# Patient Record
Sex: MALE | Race: ASIAN | NOT HISPANIC OR LATINO | Employment: UNEMPLOYED | URBAN - METROPOLITAN AREA
[De-identification: names, ages, dates, MRNs, and addresses within clinical notes are randomized per-mention and may not be internally consistent; named-entity substitution may affect disease eponyms.]

---

## 2022-01-03 ENCOUNTER — E-VISIT (OUTPATIENT)
Dept: URGENT CARE | Facility: URGENT CARE | Age: 62
End: 2022-01-03

## 2022-01-03 DIAGNOSIS — Z20.822 SUSPECTED COVID-19 VIRUS INFECTION: Primary | ICD-10-CM

## 2022-01-03 PROCEDURE — 99421 OL DIG E/M SVC 5-10 MIN: CPT | Performed by: PREVENTIVE MEDICINE

## 2022-01-03 NOTE — PATIENT INSTRUCTIONS
Brandan,      Based on your responses, you may have coronavirus (COVID-19). This illness can cause fever, cough and trouble breathing. Many people get a mild case and get better on their own. Some people can get very sick.    Will I be tested for COVID-19?  We would like to test you for COVID-19 virus. I have placed orders for this test.     To schedule: go to your [employer] home page and scroll down to the section that says  You have an appointment that needs to be scheduled  and click the large green button that says  Schedule Now  and follow the steps to find the next available openings.    If you are unable to complete these [employer] scheduling steps, please call [phone] to schedule your testing.     Return to work/school/ guidance:  Please let your workplace manager and staffing office know when your isolation ends.       If you receive a positive COVID-19 test result, follow the guidance of the those who are giving you the results. Usually the return to work is 10 days from symptom onset or positive test date, (or in some cases 20 days if you are immunocompromised). If your symptoms started after your positive test, the 10 days should start when your symptoms started.   o If you work at [employer], you must also be cleared by Employee Occupational Health and Safety to return to work.      If you receive a negative COVID-19 test result and did not have a high risk exposure to someone with a known positive COVID-19 test, you can return to work once you're free of fever for 24 hours without fever-reducing medication and your symptoms are improving or resolved.    If you receive a negative COVID-19 test and had a high-risk exposure to someone who has tested positive for COVID-19 then you can return to work 14 days after your last contact with the positive individual. Follow quarantine guidance given by your doctor or public health officials.     Sign up for Get[employer] Loop:  We know it's scary to hear  that you might have COVID-19. We want to track your symptoms to make sure you're okay over the next 2 weeks. Please look for an email from [employer]--this is a free, online program that we'll use to keep in touch. To sign up, follow the link in the email you will receive. Learn more at [email or web address]    How can I take care of myself?  Over the counter medications may help with your symptoms like congestion, cough, chills, or fever. I have sent in a prescription for There are not many effective prescription treatments for early COVID-19. Hydroxychloroquine, ivermectin, and azithromycin are not effective or recommended for COVID-19.    If your symptoms started in the last 10 days, you may be able to receive a treatment with monoclonal antibodies. This treatment can lower your risk of severe illness and going to the hospital. It is given through an IV or under your skin (subcutaneous) and must be given at an infusion center. You must be 12 or older, weight at least 88 pounds, and have a positive COVID-19 test.     If you would like to sign up to be considered to receive the monoclonal antibody medicine, please complete a participation form through the [facility] here: MNRAP ([email or web address]). You may also call the [facility] COVID-19 Public Hotline at 1-551.865.7163 (open Mon-Fri: 9am-7pm and Sat: 10am-6pm).     Not all people who are eligible will receive the medicine, since supply is limited. You will be contacted in the next 1 to 2 business days only if you are selected. If you do not receive a call, you have not been selected to receive the medicine. If you have any questions about this medication, please contact your primary care provider. For more information, see [email or web address]/[email or web address]      Get lots of rest. Drink extra fluids (unless a doctor has told you not to)    Take Tylenol  (acetaminophen) or ibuprofen for fever or pain. If you have liver or kidney problems, ask your family doctor if it's okay to take Tylenol o ibuprofen    Take over the counter medications for your symptoms, as directed by your doctor. You may also talk to your pharmacist.      If you have other health problems (like cancer, heart failure, an organ transplant or severe kidney disease): Call your specialty clinic if you don't feel better in the next 2 days.    Know when to call 911. Emergency warning signs include:  o Trouble breathing or shortness of breath  o Pain or pressure in the chest that doesn't go away  o Feeling confused like you haven't felt before, or not being able to wake up  o Bluish-colored lips or face    Where can I get more information?    [facility] - About COVID-19: [email or web address]     CDC - What to Do If You're Sick:     www.cdc.gov/coronavirus/2019-ncov/about/steps-when-sick.html    CDC - Ending Home Isolation:  https://www.cdc.gov/coronavirus/2019-ncov/your-health/quarantine-isolation.html    CDC - Caring for Someone:  www.cdc.gov/coronavirus/2019-ncov/if-you-are-sick/care-for-someone.html    [facility] clinical trials (COVID-19 research studies): clinicalaffairs.[facility]/n-clinical-trials    Below are the COVID-19 hotlines at the [facility] ([facility]). Interpreters are available.  o For health questions: Call 666-747-9663 or 1-133.564.5058 (7 a.m. to 7 p.m.)  o For questions about schools and childcare: Call 485-325-2081 or 1-397.726.5252 (7 a.m. to 7 p.m.)

## 2022-01-04 ENCOUNTER — LAB (OUTPATIENT)
Dept: URGENT CARE | Facility: URGENT CARE | Age: 62
End: 2022-01-04
Attending: PREVENTIVE MEDICINE

## 2022-01-04 DIAGNOSIS — Z20.822 SUSPECTED COVID-19 VIRUS INFECTION: ICD-10-CM

## 2022-01-04 PROCEDURE — U0005 INFEC AGEN DETEC AMPLI PROBE: HCPCS

## 2022-01-04 PROCEDURE — U0003 INFECTIOUS AGENT DETECTION BY NUCLEIC ACID (DNA OR RNA); SEVERE ACUTE RESPIRATORY SYNDROME CORONAVIRUS 2 (SARS-COV-2) (CORONAVIRUS DISEASE [COVID-19]), AMPLIFIED PROBE TECHNIQUE, MAKING USE OF HIGH THROUGHPUT TECHNOLOGIES AS DESCRIBED BY CMS-2020-01-R: HCPCS

## 2022-01-05 LAB — SARS-COV-2 RNA RESP QL NAA+PROBE: NEGATIVE

## 2022-02-06 ENCOUNTER — HEALTH MAINTENANCE LETTER (OUTPATIENT)
Age: 62
End: 2022-02-06

## 2022-10-03 ENCOUNTER — HEALTH MAINTENANCE LETTER (OUTPATIENT)
Age: 62
End: 2022-10-03

## 2023-02-12 ENCOUNTER — HEALTH MAINTENANCE LETTER (OUTPATIENT)
Age: 63
End: 2023-02-12

## 2023-09-15 NOTE — TELEPHONE ENCOUNTER
DIAGNOSIS: Pain in hands /self / [facility] / No images    APPOINTMENT DATE: 09/28/23   NOTES STATUS DETAILS   OFFICE NOTE from referring provider N/A Self   MEDICATION LIST Care Everywhere

## 2023-09-21 DIAGNOSIS — M79.641 BILATERAL HAND PAIN: Primary | ICD-10-CM

## 2023-09-21 DIAGNOSIS — M79.642 BILATERAL HAND PAIN: Primary | ICD-10-CM

## 2023-09-28 ENCOUNTER — ANCILLARY PROCEDURE (OUTPATIENT)
Dept: GENERAL RADIOLOGY | Facility: CLINIC | Age: 63
End: 2023-09-28
Attending: FAMILY MEDICINE
Payer: COMMERCIAL

## 2023-09-28 ENCOUNTER — PRE VISIT (OUTPATIENT)
Dept: ORTHOPEDICS | Facility: CLINIC | Age: 63
End: 2023-09-28

## 2023-09-28 ENCOUNTER — OFFICE VISIT (OUTPATIENT)
Dept: ORTHOPEDICS | Facility: CLINIC | Age: 63
End: 2023-09-28
Payer: COMMERCIAL

## 2023-09-28 DIAGNOSIS — M79.5 FOREIGN BODY (FB) IN SOFT TISSUE: Primary | ICD-10-CM

## 2023-09-28 DIAGNOSIS — M65.329 TRIGGER INDEX FINGER, UNSPECIFIED LATERALITY: ICD-10-CM

## 2023-09-28 DIAGNOSIS — M79.641 BILATERAL HAND PAIN: ICD-10-CM

## 2023-09-28 DIAGNOSIS — M79.642 BILATERAL HAND PAIN: ICD-10-CM

## 2023-09-28 PROCEDURE — 99203 OFFICE O/P NEW LOW 30 MIN: CPT | Performed by: FAMILY MEDICINE

## 2023-09-28 PROCEDURE — 73130 X-RAY EXAM OF HAND: CPT | Mod: GC | Performed by: RADIOLOGY

## 2023-09-28 RX ORDER — LOSARTAN POTASSIUM 25 MG/1
1 TABLET ORAL DAILY
COMMUNITY
Start: 2021-09-23 | End: 2024-07-10

## 2023-09-28 RX ORDER — ATORVASTATIN CALCIUM 40 MG/1
1 TABLET, FILM COATED ORAL EVERY EVENING
COMMUNITY
Start: 2021-09-23 | End: 2024-07-10

## 2023-09-28 RX ORDER — CELECOXIB 100 MG/1
100 CAPSULE ORAL
COMMUNITY
Start: 2023-09-26 | End: 2024-01-05

## 2023-09-28 RX ORDER — FUROSEMIDE 20 MG
TABLET ORAL
COMMUNITY
Start: 2023-09-14 | End: 2023-12-12

## 2023-09-28 NOTE — LETTER
September 28, 2023    Brandan Ny  [address] [address]              To Whom this May Concern,    Please excuse Brandan from work on 9/28/23, he was evaluated in clinic today.       Sincerely,      Nirav De La Cruz MD

## 2023-09-28 NOTE — PROGRESS NOTES
Hand pain      Right finger Third digit PIP and MCP joint pain with swelling, troublesome over the last 8 weeks.    X-ray of the right hand suggests a metal foreign body in the soft tissue in the area of the third digit PIP.  This area was recently sharply uncomfortable during an MRI.    Pt does medical component assembly for employment.  He wears vinyl gloves and will use his hands to push long stretches of very thin metallic wire.  He does not remember a particular incident where metal pierced his hand in the last 2 months, but it is present on x-ray, in the area where his right third finger is painful.    He will also notice recurrent triggering of the index finger of the bilateral hands.  He will intermittently have to manually reduce the right index finger from flexion.      Patient recently prescribed Celebrex 100 mg twice daily from primary provider for hand pain    PMH:   Prediabetes 06/21/2021    Hyperlipidemia LDL goal <70 06/11/2021    Insomnia 06/11/2021    Microalbuminuria 06/21/2021    Thalassemia 06/11/2021    Former tobacco use 06/02/2021    Routine adult health maintenance 09/30/2022    Screening for colon cancer 09/30/2022    Prostate cancer screening 09/30/2022    Screening for cardiovascular condition 09/30/2022    Memory change 09/30/2022    Essential hypertension 09/30/2022    Supraclavicular mass 09/08/2023    Bilateral hand swelling 09/08/2023    Screening for lung cancer 09/08/2023     Active problem list:  There is no problem list on file for this patient.      FH:  No family history on file.    SH:  Social History     Socioeconomic History    Marital status: Patient Declined     Spouse name: Not on file    Number of children: Not on file    Years of education: Not on file    Highest education level: Not on file   Occupational History    Not on file   Tobacco Use    Smoking status: Not on file    Smokeless tobacco: Not on file   Substance and Sexual Activity    Alcohol use: Not on file     Drug use: Not on file    Sexual activity: Not on file   Other Topics Concern    Not on file   Social History Narrative    Not on file     Social Determinants of Health     Financial Resource Strain: Not on file   Food Insecurity: Not on file   Transportation Needs: Not on file   Physical Activity: Not on file   Stress: Not on file   Social Connections: Not on file   Interpersonal Safety: Not on file   Housing Stability: Not on file       MEDS:  See EMR, reviewed  ALL:  See EMR, reviewed    REVIEW OF SYSTEMS:  CONSTITUTIONAL:NEGATIVE for fever, chills, change in weight  INTEGUMENTARY/SKIN: NEGATIVE for worrisome rashes, moles or lesions  EYES: NEGATIVE for vision changes or irritation  ENT/MOUTH: NEGATIVE for ear, mouth and throat problems  RESP:NEGATIVE for significant cough or SOB  BREAST: NEGATIVE for masses, tenderness or discharge  CV: NEGATIVE for chest pain, palpitations or peripheral edema  GI: NEGATIVE for nausea, abdominal pain, heartburn, or change in bowel habits  :NEGATIVE for frequency, dysuria, or hematuria  :NEGATIVE for frequency, dysuria, or hematuria  NEURO: NEGATIVE for weakness, dizziness or paresthesias  ENDOCRINE: NEGATIVE for temperature intolerance, skin/hair changes  HEME/ALLERGY/IMMUNE: NEGATIVE for bleeding problems  PSYCHIATRIC: NEGATIVE for changes in mood or affect    Objective: He has active triggering of the right index finger and mild triggering of the left index finger.  No active synovitis.  He has a tender area along the ulnar/palmar aspect of the right third finger in the area of the PIP joint.  No active cellulitis.  Normal range of motion at the right third finger and normal flexion and extension of the digit against resistance.    I personally reviewed with the patient x-ray that shows a metallic foreign body that appears to be a wire, broken in half, about a centimeter in size, and the soft tissue of the third finger of the right hand in the area described.    Assessment:  Foreign body, right third finger.  Bilateral index finger trigger fingers.    Plan: The right third finger has been giving him symptomatic discomfort and swelling for the last 8 weeks in the area of the foreign body.  He will have to manually reduce the right trigger finger.  He was given oval 8 splints for the bilateral trigger fingers today to use for 2 to 3 days at a time during periods of symptomatic triggering.  I will refer him to hand surgery for the foreign body and for the trigger fingers.

## 2023-09-28 NOTE — PROGRESS NOTES
Sports Medicine Clinic Visit    PCP: No Ref-Primary, Physician    Brandan Alejandra is a 63 year old male who is seen  in consultation at the request of Dr. Chen presenting with bilateral hand pain. He reports pain in the left pointer finger and right middle finger.     Injury: No     Location of Pain: bilateral hands; left indexfinger and right middle finger.   Duration of Pain: 2 month(s)  Rating of Pain: 8/10  Pain is better with: Celebrex   Pain is worse with: When using the hands;    Additional Features: Locking   Treatment so far consists of: Splint   Prior History of related problems: No     There were no vitals taken for this visit.

## 2023-09-28 NOTE — LETTER
9/28/2023      RE: Brandan Alejandra  [address] [place] [address]     Dear Colleague,    Thank you for referring your patient, Brandan Alejandra, to the [facility] [place]. Please see a copy of my visit note below.    Hand pain      Right finger Third digit PIP and MCP joint pain with swelling, troublesome over the last 8 weeks.    X-ray of the right hand suggests a metal foreign body in the soft tissue in the area of the third digit PIP.  This area was recently sharply uncomfortable during an MRI.    Pt does medical component assembly for employment.  He wears vinyl gloves and will use his hands to push long stretches of very thin metallic wire.  He does not remember a particular incident where metal pierced his hand in the last 2 months, but it is present on x-ray, in the area where his right third finger is painful.    He will also notice recurrent triggering of the index finger of the bilateral hands.  He will intermittently have to manually reduce the right index finger from flexion.      Patient recently prescribed Celebrex 100 mg twice daily from primary provider for hand pain    PMH:   Prediabetes 06/21/2021    Hyperlipidemia LDL goal <70 06/11/2021    Insomnia 06/11/2021    Microalbuminuria 06/21/2021    Thalassemia 06/11/2021    Former tobacco use 06/02/2021    Routine adult health maintenance 09/30/2022    Screening for colon cancer 09/30/2022    Prostate cancer screening 09/30/2022    Screening for cardiovascular condition 09/30/2022    Memory change 09/30/2022    Essential hypertension 09/30/2022    Supraclavicular mass 09/08/2023    Bilateral hand swelling 09/08/2023    Screening for lung cancer 09/08/2023     Active problem list:  There is no problem list on file for this patient.      FH:  No family history on file.    SH:  Social History     Socioeconomic History    Marital status: Patient Declined     Spouse name: Not on file    Number of children:  Not on file    Years of education: Not on file    Highest education level: Not on file   Occupational History    Not on file   Tobacco Use    Smoking status: Not on file    Smokeless tobacco: Not on file   Substance and Sexual Activity    Alcohol use: Not on file    Drug use: Not on file    Sexual activity: Not on file   Other Topics Concern    Not on file   Social History Narrative    Not on file     Social Determinants of Health     Financial Resource Strain: Not on file   Food Insecurity: Not on file   Transportation Needs: Not on file   Physical Activity: Not on file   Stress: Not on file   Social Connections: Not on file   Interpersonal Safety: Not on file   Housing Stability: Not on file       MEDS:  See EMR, reviewed  ALL:  See EMR, reviewed    REVIEW OF SYSTEMS:  CONSTITUTIONAL:NEGATIVE for fever, chills, change in weight  INTEGUMENTARY/SKIN: NEGATIVE for worrisome rashes, moles or lesions  EYES: NEGATIVE for vision changes or irritation  ENT/MOUTH: NEGATIVE for ear, mouth and throat problems  RESP:NEGATIVE for significant cough or SOB  BREAST: NEGATIVE for masses, tenderness or discharge  CV: NEGATIVE for chest pain, palpitations or peripheral edema  GI: NEGATIVE for nausea, abdominal pain, heartburn, or change in bowel habits  :NEGATIVE for frequency, dysuria, or hematuria  :NEGATIVE for frequency, dysuria, or hematuria  NEURO: NEGATIVE for weakness, dizziness or paresthesias  ENDOCRINE: NEGATIVE for temperature intolerance, skin/hair changes  HEME/ALLERGY/IMMUNE: NEGATIVE for bleeding problems  PSYCHIATRIC: NEGATIVE for changes in mood or affect    Objective: He has active triggering of the right index finger and mild triggering of the left index finger.  No active synovitis.  He has a tender area along the ulnar/palmar aspect of the right third finger in the area of the PIP joint.  No active cellulitis.  Normal range of motion at the right third finger and normal flexion and extension of the digit  against resistance.    I personally reviewed with the patient x-ray that shows a metallic foreign body that appears to be a wire, broken in half, about a centimeter in size, and the soft tissue of the third finger of the right hand in the area described.    Assessment: Foreign body, right third finger.  Bilateral index finger trigger fingers.    Plan: The right third finger has been giving him symptomatic discomfort and swelling for the last 8 weeks in the area of the foreign body.  He will have to manually reduce the right trigger finger.  He was given oval 8 splints for the bilateral trigger fingers today to use for 2 to 3 days at a time during periods of symptomatic triggering.  I will refer him to hand surgery for the foreign body and for the trigger fingers.      Sports Medicine Clinic Visit    PCP: No Ref-Primary, Physician    Brandan Alejandra is a 63 year old male who is seen  in consultation at the request of Dr. Chen presenting with bilateral hand pain. He reports pain in the left pointer finger and right middle finger.     Injury: No     Location of Pain: bilateral hands; left indexfinger and right middle finger.   Duration of Pain: 2 month(s)  Rating of Pain: 8/10  Pain is better with: Celebrex   Pain is worse with: When using the hands;    Additional Features: Locking   Treatment so far consists of: Splint   Prior History of related problems: No     There were no vitals taken for this visit.  Nirav De La Cruz MD

## 2023-10-10 ENCOUNTER — TRANSCRIBE ORDERS (OUTPATIENT)
Dept: OTHER | Age: 63
End: 2023-10-10

## 2023-10-10 DIAGNOSIS — C73 THYROID CANCER (H): Primary | ICD-10-CM

## 2023-10-16 ENCOUNTER — PATIENT OUTREACH (OUTPATIENT)
Dept: ONCOLOGY | Facility: CLINIC | Age: 63
End: 2023-10-16
Payer: COMMERCIAL

## 2023-10-16 NOTE — PROGRESS NOTES
"Daughter, \"Von\" called to ask several questions regarding her dad's condition.  He is having hand surgery tomorrow which I said he should still have done.  He sees Endocrinology on WED 10/18 which I said he should have done.  He is also now scheduled for an ENT consult on MON 10/30 w/Dr. Mendez.  ~I did defer many questions to when they meet with the ENT surgeon.  ~Von thinks they will want to switch to [facility] at some point which I said would be fine and they can let ENT know this.  ~I also explained that her dad may not need to see Medical Oncology.  "

## 2023-10-16 NOTE — TELEPHONE ENCOUNTER
FUTURE VISIT INFORMATION      FUTURE VISIT INFORMATION:  Date: 10/30/23  Time: 2 PM  Location: [facility]  REFERRAL INFORMATION:  Referring provider:    Referring providers clinic:    Reason for visit/diagnosis  Thyroid cancer- Referred by CHARLOTTE Blankenship     RECORDS REQUESTED FROM:       Clinic name Comments Records Status Imaging Status   [facility] FAMILY MEDICINE  10/10/23 note- Ja Corona CNP   9/8/23 note- Penny Garrido APRN, CNP   CE    [facility] ENDOCRINOLOGY 10/18/23 note- Kamron Ochoa  CE    Imaging US NEEDLE BX 9/25/23  US NECK THYROID HEAD  9/12/23   CE Req 10/16/23  PACS   [address] [address]  Tel [phone]  Fax [phone] 9/25/2023 Case: [id number]   Non-Gynecologic Cytology Interpretation    A.  Thyroid Right, right thyroid nodule, fine needle aspiration:  Suspicious for neoplasm, see [place] Consultation report below in scanned documents     [employer]: [id number] CE    Pending req    Path req 10/24/23     Path received 10/26/23      October 16, 2023 2:23 PM - request images from [place] pushed to [facility]  October 24, 2023 7:06 AM - Image resolved in PACS. Request for path slides -[place]  October 26, 2023 1:07 PM - 10 slides 9/25/2023 Case: [id number]  received from [place] and sent to 5th floor path lab for review with consult form.

## 2023-10-16 NOTE — PROGRESS NOTES
I called and left Brandan a detailed vm.  I let him know we rec'd the referral for medical oncology but actually he needs to see ENT.  I explained ENT weill decide if surgery is an option.  He has an appt on WED 10/18 w/Endocrinology, which I said is good.  Depending on how those two consults go he may or may not need to follow up with oncology.    I left my direct contact information for any questions he may have.

## 2023-10-17 ENCOUNTER — OFFICE VISIT (OUTPATIENT)
Dept: ORTHOPEDICS | Facility: CLINIC | Age: 63
End: 2023-10-17
Attending: FAMILY MEDICINE
Payer: COMMERCIAL

## 2023-10-17 VITALS — WEIGHT: 178.3 LBS | DIASTOLIC BLOOD PRESSURE: 91 MMHG | SYSTOLIC BLOOD PRESSURE: 138 MMHG

## 2023-10-17 DIAGNOSIS — M65.322 TRIGGER FINGER, LEFT INDEX FINGER: ICD-10-CM

## 2023-10-17 DIAGNOSIS — M79.5 FOREIGN BODY (FB) IN SOFT TISSUE: ICD-10-CM

## 2023-10-17 DIAGNOSIS — M65.331 ACQUIRED TRIGGER FINGER OF RIGHT MIDDLE FINGER: Primary | ICD-10-CM

## 2023-10-17 PROCEDURE — 20550 NJX 1 TENDON SHEATH/LIGAMENT: CPT | Mod: F1 | Performed by: STUDENT IN AN ORGANIZED HEALTH CARE EDUCATION/TRAINING PROGRAM

## 2023-10-17 PROCEDURE — 99204 OFFICE O/P NEW MOD 45 MIN: CPT | Mod: 25 | Performed by: STUDENT IN AN ORGANIZED HEALTH CARE EDUCATION/TRAINING PROGRAM

## 2023-10-17 PROCEDURE — 20550 NJX 1 TENDON SHEATH/LIGAMENT: CPT | Mod: F7 | Performed by: STUDENT IN AN ORGANIZED HEALTH CARE EDUCATION/TRAINING PROGRAM

## 2023-10-17 RX ORDER — TESTOSTERONE CYPIONATE 200 MG/ML
1 INJECTION INTRAMUSCULAR
Status: SHIPPED | OUTPATIENT
Start: 2023-10-17

## 2023-10-17 RX ORDER — LIDOCAINE HYDROCHLORIDE 10 MG/ML
1 INJECTION, SOLUTION INFILTRATION; PERINEURAL
Status: SHIPPED | OUTPATIENT
Start: 2023-10-17

## 2023-10-17 RX ADMIN — TESTOSTERONE CYPIONATE 1 ML: 200 INJECTION INTRAMUSCULAR at 10:32

## 2023-10-17 RX ADMIN — LIDOCAINE HYDROCHLORIDE 1 ML: 10 INJECTION, SOLUTION INFILTRATION; PERINEURAL at 10:35

## 2023-10-17 RX ADMIN — TESTOSTERONE CYPIONATE 1 ML: 200 INJECTION INTRAMUSCULAR at 10:35

## 2023-10-17 RX ADMIN — LIDOCAINE HYDROCHLORIDE 1 ML: 10 INJECTION, SOLUTION INFILTRATION; PERINEURAL at 10:32

## 2023-10-17 NOTE — LETTER
10/17/2023         RE: Brandan Alejandra  [address] [place] [address]        Dear Colleague,    Thank you for referring your patient, Brandan Alejandra, to the [facility] [place]. Please see a copy of my visit note below.    Orthopaedic Surgery Hand and Upper Extremity Clinic H&P Note:  Date: Oct 17, 2023    Patient Name: Brandan Alejandra  MRN: [id number]    Consult requested by: Nirav De La Cruz    CHIEF COMPLAINT: bilateral hand pain    Dominant Hand: left  Occupation: Medical device assembly      HPI:  Mr. Brandan Alejandra is a 63 year old male left hand dominant who presents with daughter for evaluation of bilateral trigger fingers. Patient developed right middle finger pain and swelling 3 months ago. The pain is from the right middle PIP to MCP joints. Recent x-rays revealed he has a piece of metal in his right middle finger. 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 This recently was burning when he had an MRI. He developed pain and triggering in his left index and right middle fingers after this time and believes it was due to using his left index finger more due to his right hand pain.  Treatment to date: oval 8 splints, Celebrex.     In addition, patient has developed intermittent pain from his right wrist to shoulder within the past few days with no known injury or trauma. One episode. Denies consistent numbness/tingling.  Pain follows the course of the median nerve in the arm.      PMH  Diabetes: yes  Thyroid Problems: yes- thyroid cancer  Smoking: former- quite 1 year ago      PAST MEDICAL HISTORY:  No past medical history on file.    PAST SURGICAL HISTORY:  No past surgical history on file.    MEDICATIONS:  Current Outpatient Medications   Medication     atorvastatin (LIPITOR) 40 MG  tablet     celecoxib (CELEBREX) 100 MG capsule     furosemide (LASIX) 20 MG tablet     losartan (COZAAR) 25 MG tablet     metFORMIN (GLUCOPHAGE) 500 MG tablet     No current facility-administered medications for this visit.       ALLERGIES:   No Known Allergies    FAMILY HISTORY:  No pertinent family history    SOCIAL HISTORY:       The patient's past medical, family, and social history was reviewed and confirmed.    REVIEW OF SYMPTOMS:      General: Negative   Eyes: Negative   Ear, Nose and Throat: Negative   Respiratory: Negative   Cardiovascular: Negative   Gastrointestinal: Negative   Genito-urinary: Negative   Musculoskeletal: Negative  Neurological: Negative   Psychological: Negative  HEME: Negative   ENDO: Negative   SKIN: Negative    VITALS:  Vitals:    10/17/23 0948   Weight: 80.9 kg (178 lb 4.8 oz)       EXAM:  General: NAD, A&Ox3  HEENT: NC/AT  CV: RRR by peripheral pulse  Pulmonary: Non-labored breathing on RA  RUE:  Dark foreign body ulnar aspect of middle finger middle phalanx distal to PIP joint, nontender, no overlying skin reaction  No evidence of infection  +TTP and crepitus MF at A1 pulley  No triggering  Intact FDP/FDS/EDC/EPL/FPL/HI  Sensation is intact to light touch median, radial, ulnar nerve distributions, no deficits  Negative Tinel's at the carpal tunnel  Positive Durkan's compression test at the carpal tunnel  Well-perfused, cap refill less than 2 seconds    LUE:  Tenderness palpation and crepitus at the A1 pulley of the index finger, visible triggering  Intact FDP, FDS, EDC  Sensation intact to light touch median, radial, ulnar nerve distributions, no deficits  Warm well-perfused, capillary fill less than 2 seconds     Labs:  HbA1c 6.3 9/8/2023    IMAGING:  X-rays of right hand demonstrates linear metallic foreign body in the soft tissues of the ulnar aspect of the middle phalanx of the middle finger    I have personally reviewed the above images and labs.         IMPRESSION AND  RECOMMENDATIONS:  Mr. Brandan Motagsduran is a 63 year old male left hand dominant with right middle finger trigger finger, right middle finger foreign body, and left index finger trigger finger.    I discussed the diagnosis, prognosis, and treatment options for trigger finger with the patient and his daughter.  I discussed both injections as well as surgical release.    The patient is concerned about the metallic foreign body in his right middle finger and wishes to have it excised to prevent any future problems.  Therefore he wishes also to proceed with a right middle finger trigger finger release.    The indications for surgery were discussed with the patient. The benefits, risks, and alternatives of operative management were discussed in detail with the patient. The patient understands that the risks of surgery include, but are not limited to: infection, bleeding, injury to nearby structures (such as nerves, blood vessels, and tendons), wound healing problems, need for additional surgery, pain, stiffness, scarring, need for rehabilitation, and anesthetic complications.  Patient expressed understanding and elected to proceed with surgery. All questions were answered to the patient's satisfaction.    Case request placed, local only.  Surgery must be scheduled after November 28, as he received injections today.    In the meantime, the patient wishes to have trigger finger injections.  After obtaining written consent, I cleansed the skin over the volar aspect of the right middle finger with chlorhexidine.  I then anesthetized the skin with ethyl chloride free spray after which I injected 1 cc of dexamethasone 4 mg/mL and 1 cc 1% lidocaine into the A1 pulley region and flexor tendon sheath of the right middle finger.  I then repeated this process and injected the same cocktail into the A1 pulley region flexor tendon sheath of the left index finger.      Hand / Upper Extremity Injection/Arthrocentesis: L index  A1    Date/Time: 10/17/2023 10:32 AM    Performed by: Umang Oneal MD  Authorized by: Umang Oneal MD    Indications:  Pain  Needle Size:  25 G  Guidance: landmark    Approach:  Volar  Condition: trigger finger    Location:  Index finger    Site:  L index A1  Medications:  1 mL dexAMETHasone 120 MG/30ML; 1 mL lidocaine 1 %  Outcome:  Tolerated well, no immediate complications  Procedure discussed: discussed risks, benefits, and alternatives    Consent Given by:  Patient  Timeout: timeout called immediately prior to procedure    Prep: patient was prepped and draped in usual sterile fashion    Hand / Upper Extremity Injection/Arthrocentesis: R long A1    Date/Time: 10/17/2023 10:35 AM    Performed by: Umang Oneal MD  Authorized by: Umang Oneal MD    Indications:  Pain  Needle Size:  25 G  Guidance: landmark    Approach:  Volar  Condition: trigger finger    Location:  Long finger    Site:  R long A1  Medications:  1 mL dexAMETHasone 120 MG/30ML; 1 mL lidocaine 1 %  Outcome:  Tolerated well, no immediate complications  Procedure discussed: discussed risks, benefits, and alternatives    Consent Given by:  Patient  Timeout: timeout called immediately prior to procedure    Prep: patient was prepped and draped in usual sterile fashion        Umang Oneal MD    Hand, Upper Extremity & Microvascular Surgery  Department of Orthopaedic Surgery  [facility]           Again, thank you for allowing me to participate in the care of your patient.        Sincerely,        Umang Oneal MD

## 2023-10-17 NOTE — LETTER
October 17, 2023      Brandan Alejandra  [address] [address]        To Whom It May Concern:    Brandan Alejandra  was seen in our clinic today and received treatment on both hands .  Please excuse him from work today due  to treatment .        Sincerely,        Umang Oneal MD

## 2023-10-17 NOTE — PROGRESS NOTES
Orthopaedic Surgery Hand and Upper Extremity Clinic H&P Note:  Date: Oct 17, 2023    Patient Name: Brandan Alejandra  MRN: [id number]    Consult requested by: Nirav De La Cruz    CHIEF COMPLAINT: bilateral hand pain    Dominant Hand: left  Occupation: Medical device assembly      HPI:  Mr. Brandan Alejandra is a 63 year old male left hand dominant who presents with daughter for evaluation of bilateral trigger fingers. Patient developed right middle finger pain and swelling 3 months ago. The pain is from the right middle PIP to MCP joints. Recent x-rays revealed he has a piece of metal in his right middle finger. 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 This recently was burning when he had an MRI. He developed pain and triggering in his left index and right middle fingers after this time and believes it was due to using his left index finger more due to his right hand pain.  Treatment to date: oval 8 splints, Celebrex.     In addition, patient has developed intermittent pain from his right wrist to shoulder within the past few days with no known injury or trauma. One episode. Denies consistent numbness/tingling.  Pain follows the course of the median nerve in the arm.      PMH  Diabetes: yes  Thyroid Problems: yes- thyroid cancer  Smoking: former- quite 1 year ago      PAST MEDICAL HISTORY:  No past medical history on file.    PAST SURGICAL HISTORY:  No past surgical history on file.    MEDICATIONS:  Current Outpatient Medications   Medication    atorvastatin (LIPITOR) 40 MG tablet    celecoxib (CELEBREX) 100 MG capsule    furosemide (LASIX) 20 MG tablet    losartan (COZAAR) 25 MG tablet    metFORMIN (GLUCOPHAGE) 500 MG tablet     No current facility-administered medications for this visit.       ALLERGIES:   No Known Allergies    FAMILY HISTORY:  No  pertinent family history    SOCIAL HISTORY:       The patient's past medical, family, and social history was reviewed and confirmed.    REVIEW OF SYMPTOMS:      General: Negative   Eyes: Negative   Ear, Nose and Throat: Negative   Respiratory: Negative   Cardiovascular: Negative   Gastrointestinal: Negative   Genito-urinary: Negative   Musculoskeletal: Negative  Neurological: Negative   Psychological: Negative  HEME: Negative   ENDO: Negative   SKIN: Negative    VITALS:  Vitals:    10/17/23 0948   Weight: 80.9 kg (178 lb 4.8 oz)       EXAM:  General: NAD, A&Ox3  HEENT: NC/AT  CV: RRR by peripheral pulse  Pulmonary: Non-labored breathing on RA  RUE:  Dark foreign body ulnar aspect of middle finger middle phalanx distal to PIP joint, nontender, no overlying skin reaction  No evidence of infection  +TTP and crepitus MF at A1 pulley  No triggering  Intact FDP/FDS/EDC/EPL/FPL/HI  Sensation is intact to light touch median, radial, ulnar nerve distributions, no deficits  Negative Tinel's at the carpal tunnel  Positive Durkan's compression test at the carpal tunnel  Well-perfused, cap refill less than 2 seconds    LUE:  Tenderness palpation and crepitus at the A1 pulley of the index finger, visible triggering  Intact FDP, FDS, EDC  Sensation intact to light touch median, radial, ulnar nerve distributions, no deficits  Warm well-perfused, capillary fill less than 2 seconds     Labs:  HbA1c 6.3 9/8/2023    IMAGING:  X-rays of right hand demonstrates linear metallic foreign body in the soft tissues of the ulnar aspect of the middle phalanx of the middle finger    I have personally reviewed the above images and labs.         IMPRESSION AND RECOMMENDATIONS:  Mr. Brandan POPE MI Sengsouvanh is a 63 year old male left hand dominant with right middle finger trigger finger, right middle finger foreign body, and left index finger trigger finger.    I discussed the diagnosis, prognosis, and treatment options for trigger finger with the  patient and his daughter.  I discussed both injections as well as surgical release.    The patient is concerned about the metallic foreign body in his right middle finger and wishes to have it excised to prevent any future problems.  Therefore he wishes also to proceed with a right middle finger trigger finger release.    The indications for surgery were discussed with the patient. The benefits, risks, and alternatives of operative management were discussed in detail with the patient. The patient understands that the risks of surgery include, but are not limited to: infection, bleeding, injury to nearby structures (such as nerves, blood vessels, and tendons), wound healing problems, need for additional surgery, pain, stiffness, scarring, need for rehabilitation, and anesthetic complications.  Patient expressed understanding and elected to proceed with surgery. All questions were answered to the patient's satisfaction.    Case request placed, local only.  Surgery must be scheduled after November 28, as he received injections today.    In the meantime, the patient wishes to have trigger finger injections.  After obtaining written consent, I cleansed the skin over the volar aspect of the right middle finger with chlorhexidine.  I then anesthetized the skin with ethyl chloride free spray after which I injected 1 cc of dexamethasone 4 mg/mL and 1 cc 1% lidocaine into the A1 pulley region and flexor tendon sheath of the right middle finger.  I then repeated this process and injected the same cocktail into the A1 pulley region flexor tendon sheath of the left index finger.      Hand / Upper Extremity Injection/Arthrocentesis: L index A1    Date/Time: 10/17/2023 10:32 AM    Performed by: Umang Oneal MD  Authorized by: Umang Oneal MD    Indications:  Pain  Needle Size:  25 G  Guidance: landmark    Approach:  Volar  Condition: trigger finger    Location:  Index finger    Site:  L index A1  Medications:  1 mL dexAMETHasone 120  MG/30ML; 1 mL lidocaine 1 %  Outcome:  Tolerated well, no immediate complications  Procedure discussed: discussed risks, benefits, and alternatives    Consent Given by:  Patient  Timeout: timeout called immediately prior to procedure    Prep: patient was prepped and draped in usual sterile fashion    Hand / Upper Extremity Injection/Arthrocentesis: R long A1    Date/Time: 10/17/2023 10:35 AM    Performed by: Umang Oneal MD  Authorized by: Umang Oneal MD    Indications:  Pain  Needle Size:  25 G  Guidance: landmark    Approach:  Volar  Condition: trigger finger    Location:  Long finger    Site:  R long A1  Medications:  1 mL dexAMETHasone 120 MG/30ML; 1 mL lidocaine 1 %  Outcome:  Tolerated well, no immediate complications  Procedure discussed: discussed risks, benefits, and alternatives    Consent Given by:  Patient  Timeout: timeout called immediately prior to procedure    Prep: patient was prepped and draped in usual sterile fashion        Umang Oneal MD    Hand, Upper Extremity & Microvascular Surgery  Department of Orthopaedic Surgery  [facility]

## 2023-10-20 ENCOUNTER — TELEPHONE (OUTPATIENT)
Dept: ORTHOPEDICS | Facility: CLINIC | Age: 63
End: 2023-10-20
Payer: COMMERCIAL

## 2023-10-20 ENCOUNTER — TRANSFERRED RECORDS (OUTPATIENT)
Dept: SURGERY | Facility: CLINIC | Age: 63
End: 2023-10-20
Payer: COMMERCIAL

## 2023-10-20 NOTE — TELEPHONE ENCOUNTER
Lona bundy's daughter calling in today to set up surgery date for hand surgery. Please call her back at [phone]

## 2023-10-23 ENCOUNTER — TRANSCRIBE ORDERS (OUTPATIENT)
Dept: OTHER | Age: 63
End: 2023-10-23

## 2023-10-23 DIAGNOSIS — E07.9 THYROID MASS: Primary | ICD-10-CM

## 2023-10-23 NOTE — TELEPHONE ENCOUNTER
[facility] Call Center    Phone Message    May a detailed message be left on voicemail: yes     Reason for Call: Other: Pt's daughter is calling in to schedule her father's hand surgery.  Can someone contact her to let her know how the process works and or schedule that? Thanks!      Action Taken: Message routed to:  Other: [facility]    Travel Screening: Not Applicable

## 2023-10-24 ENCOUNTER — TELEPHONE (OUTPATIENT)
Dept: ENDOCRINOLOGY | Facility: CLINIC | Age: 63
End: 2023-10-24
Payer: COMMERCIAL

## 2023-10-24 NOTE — TELEPHONE ENCOUNTER
[facility] Call Center    Phone Message    May a detailed message be left on voicemail: yes     Reason for Call: Appointment Intake    Referring Provider Name:  SONALI ERICKSON   Diagnosis and/or Symptoms: E07.9 (ICD-10-CM) - Thyroid mass  Scheduled 11/28/23 Dr. Sarabia per procotol please review, no availabilities within 2 weeks    Action Taken: Other: ENDO    Travel Screening: Not Applicable

## 2023-10-25 ENCOUNTER — TELEPHONE (OUTPATIENT)
Dept: ORTHOPEDICS | Facility: CLINIC | Age: 63
End: 2023-10-25

## 2023-10-25 PROBLEM — M79.5 FOREIGN BODY (FB) IN SOFT TISSUE: Status: ACTIVE | Noted: 2023-10-17

## 2023-10-25 NOTE — TELEPHONE ENCOUNTER
DX, Referring NOTES: Thyroid Mass; referred by Dr. Kamron Ochoa    For Cancer Patients: Need the original operative and surgical pathology reports and all imaging reports/images related to the disease (includes all thyroid US, nuclear thyroid and total body scans, PET scans, chest CT reports since prior to the diagnosis ).   APPT DATE: 11/28/2023   NOTES (FOR ALL VISITS) STATUS DETAILS   OFFICE NOTES from referring provider Care Everywhere [facility]:  10/18/23 - ENDO OV with Dr. Ochoa   OFFICE NOTES from other specialist Internal / Care Everywhere [facility]:  10/30/23 - ENT OV with Dr. Mendez    [facility]:  10/27/23 - PCC OV with Dr. Ramey  10/10/23 - PCC OV with Ja Corona NP  9/8/23 - PCC OV with Penny Garrido NP   ED NOTES N/A    OPERATIVE REPORT  (thyroid, pituitary, adrenal, parathyroid)  (All op notes related to diagnoses) N/A    MEDICATION LIST Internal    IMAGING      MRI (BRAIN) Received [facility]:  9/27/23 - MRI Chest   XR (Chest) Received [facility]:  9/8/23 - XR Chest   CT (HEAD/NECK/CHEST/ABDOMEN) Received [facility]:  9/18/23 - CT Chest  9/15/23 - CT Chest   ULTRASOUND (HEAD/NECK)  * Include FNAs Received [facility]:  9/25/23 - US Thyroid FNA  9/12/23 - US Head/Neck/Thyroid   LABS     DIABETES: HBGA1C, CREATININE, FASTING LIPIDS, MICROALBUMIN URINE, POTASSIUM, TSH, T4    THYROID: TSH, T4, CBC, THYRODLONULIN, TOTAL T3, FREE T4, CALCITONIN, CEA Care Everywhere [facility]:  10/18/23 - Calcitonin  10/18/23 - CMP  10/18/23 - Magnesium  10/18/23 - Parathyroid Hormone  10/18/23 - TSH, T4  9/8/23 - HBGA1C  9/8/23 - Lipid  9/8/23 - Urine Analysis  9/30/22 - BMP  9/13/21 - Ferritin    [facility]:  7/17/20 - CBC  4/16/20 - Magnesium  4/16/20 - Phosphorus   PATHOLOGY REPORTS WITH CASE NUMBER  *Surgical path reports for endocrine organs (ovaries, testes, pancreas, etc) Care Everywhere   [facility]:  9/25/23 - Thyroid FNA (Case: [id number])

## 2023-10-25 NOTE — TELEPHONE ENCOUNTER
Patient has been scheduled for surgery. Details are below.    Date of Surgery: 12/15/23    Approximate Arrival Time: surgery center will call to confirm time    Surgeon:  Dr. Umang Oneal     Procedure: Release Right Middle Trigger Finger  Location: [facility] [place] [address]  Surgery Consult: na  PreOp Physical: na  PostOp: 12/29/23  Packet Mailed/MyChart Sent: yes  Added to [place]: yes

## 2023-10-27 ENCOUNTER — LAB REQUISITION (OUTPATIENT)
Dept: LAB | Facility: CLINIC | Age: 63
End: 2023-10-27
Payer: COMMERCIAL

## 2023-10-27 PROCEDURE — 88321 CONSLTJ&REPRT SLD PREP ELSWR: CPT | Mod: GC | Performed by: PATHOLOGY

## 2023-10-30 ENCOUNTER — PRE VISIT (OUTPATIENT)
Dept: OTOLARYNGOLOGY | Facility: CLINIC | Age: 63
End: 2023-10-30

## 2023-10-30 ENCOUNTER — ANCILLARY PROCEDURE (OUTPATIENT)
Dept: ULTRASOUND IMAGING | Facility: CLINIC | Age: 63
End: 2023-10-30
Attending: STUDENT IN AN ORGANIZED HEALTH CARE EDUCATION/TRAINING PROGRAM
Payer: COMMERCIAL

## 2023-10-30 ENCOUNTER — OFFICE VISIT (OUTPATIENT)
Dept: OTOLARYNGOLOGY | Facility: CLINIC | Age: 63
End: 2023-10-30
Payer: COMMERCIAL

## 2023-10-30 VITALS
DIASTOLIC BLOOD PRESSURE: 96 MMHG | HEIGHT: 67 IN | OXYGEN SATURATION: 98 % | BODY MASS INDEX: 26.84 KG/M2 | SYSTOLIC BLOOD PRESSURE: 146 MMHG | WEIGHT: 171 LBS | HEART RATE: 82 BPM

## 2023-10-30 DIAGNOSIS — E07.9 THYROID MASS: Primary | ICD-10-CM

## 2023-10-30 DIAGNOSIS — E07.9 THYROID MASS: ICD-10-CM

## 2023-10-30 LAB
PATH REPORT.COMMENTS IMP SPEC: NORMAL
PATH REPORT.FINAL DX SPEC: NORMAL
PATH REPORT.GROSS SPEC: NORMAL
PATH REPORT.MICROSCOPIC SPEC OTHER STN: NORMAL
PATH REPORT.RELEVANT HX SPEC: NORMAL
PATH REPORT.RELEVANT HX SPEC: NORMAL
PATH REPORT.SITE OF ORIGIN SPEC: NORMAL

## 2023-10-30 PROCEDURE — 76536 US EXAM OF HEAD AND NECK: CPT | Mod: GC | Performed by: STUDENT IN AN ORGANIZED HEALTH CARE EDUCATION/TRAINING PROGRAM

## 2023-10-30 PROCEDURE — 99205 OFFICE O/P NEW HI 60 MIN: CPT | Mod: 25 | Performed by: STUDENT IN AN ORGANIZED HEALTH CARE EDUCATION/TRAINING PROGRAM

## 2023-10-30 PROCEDURE — 31575 DIAGNOSTIC LARYNGOSCOPY: CPT | Performed by: STUDENT IN AN ORGANIZED HEALTH CARE EDUCATION/TRAINING PROGRAM

## 2023-10-30 ASSESSMENT — PAIN SCALES - GENERAL: PAINLEVEL: NO PAIN (0)

## 2023-10-30 NOTE — PATIENT INSTRUCTIONS
"You were seen in the clinic today by Dr. Mendez. If you have any questions or concerns after your appointment, please call the clinic at [phone]. Press \"1\" for scheduling, press \"3\" for nurse advice.    2.   The following has been recommended for you based upon your appointment today:   -US head neck today   -Please call us with your decision on surgery     Jessica STOLL, RN  RN Care Coordinator, ENT Clinic  [facility]  Direct: [phone]   "

## 2023-10-30 NOTE — LETTER
10/30/2023       RE: Brandan Alejandra  [address]  [facility] [address]     Dear Colleague,    Thank you for referring your patient, Brandan Alejandra, to the [facility] E[facility]AT [facility]IN[facility] [place] [facility]. Please see a copy of my visit note below.    Head and Neck Surgery  10/30/23    I had the pleasure of meeting Mr Alejandra and his son in law today in clinic.    He is a pleasant 63 year old male who is referred for evaluation of a thyroid nodule.     He was seen in September and a right neck mass was noted. An US was performed in September showing a 4.5 cm right thyroid nodule TR3. Subsequent biopsy done in September showed an indeterminate biopsy. It was reported as suspicious for neoplasm at [place]. Review at [place] showed suspicion for neoplasm including oncocytic follicular neoplasm and medullary thyroid cancer. 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     Calcitonin, metanephrines, aldosterone all normal/undetectable. Parathyroid hormone normal.      TSH normal    I obtained bilateral neck US that does not show abnormal adenopathy    He has no family history of thyroid cancer or radiation exposure.    No symptoms related to the thyroid.    Has an appt with Dr Sarabia in November.    His workup has also shown a likely schwannoma in the left axilla    PMH:  High cholesterol  HTN  diabetes    PSH:  None    Medications:  Losartan  Lasix  Metformin  Atorvostatin    Allergies:  NKDA    Social History:  Past smoker, quit in 2022  No ETOH    Family History:  None    ROS:  12 pt review of systems performed and negative asides from HPI    Physical Examination:  Alert, NAD  Strong voice  Breathing comfortably  Palpable right thyroid mass  No adenopathy  No lesions in oral cavity or oropharynx    Procedure:  Fiberoptic laryngoscopy performed showing normal vocal cord mobility      Assessment and  plan:  63 year old male with an indeterminate right thyroid nodule. There has been some question about possible medullary thyroid cancer. However, our pathologists favor hurthle cell neoplasm. Calcitonin is also undetectable. He has no evidence of lateral neck adenopathy.    We discussed options including diagnostic right hemithyroidectomy vs proceeding with total thyroidectomy given the size of the nodule. We discussed pros and cons of each approach. He understands that if we proceed with a hemithyroidectomy and pathology shows malignancy, we may need to return to the OR for completion thyroidectomy to facilitate radioactive iodine.     We discussed the risks of thyroid surgery including but not limited to infection, bleeding, return trips to the OR, 1% risk of permanent recurrent laryngeal nerve injury, 5-10% risk of temporary weakness, hypoparathyroidism. He understands that he will need to start synthroid on POD1 if a total thyroidectomy is pursued.     I will evaluate the central neck intraoperatively and perform a central neck dissection if any adenopathy is encountered.     He will discuss with his family and let us know how he would like to proceed.    Chris Mendez MD    60 minutes spent on the date of the encounter in chart review, patient visit, review of tests, documentation and/or discussion with other providers about the issues documented above asides from time spent doing flexible laryngoscopy.                 Again, thank you for allowing me to participate in the care of your patient.      Sincerely,    Chris Mendez MD

## 2023-10-31 NOTE — TELEPHONE ENCOUNTER
Endocrine triage  The scheduled fendocrine appointment timeframe is acceptable..She has already seen Dr Mendez 10/30.   Elana Sarabia MD

## 2023-11-02 NOTE — PROGRESS NOTES
Head and Neck Surgery  10/30/23    I had the pleasure of meeting Mr Alejandra and his son in law today in clinic.    He is a pleasant 63 year old male who is referred for evaluation of a thyroid nodule.     He was seen in September and a right neck mass was noted. An US was performed in September showing a 4.5 cm right thyroid nodule TR3. Subsequent biopsy done in September showed an indeterminate biopsy. It was reported as suspicious for neoplasm at [place]. Review at [place] showed suspicion for neoplasm including oncocytic follicular neoplasm and medullary thyroid cancer. 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     Calcitonin, metanephrines, aldosterone all normal/undetectable. Parathyroid hormone normal.      TSH normal    I obtained bilateral neck US that does not show abnormal adenopathy    He has no family history of thyroid cancer or radiation exposure.    No symptoms related to the thyroid.    Has an appt with Dr Sarabia in November.    His workup has also shown a likely schwannoma in the left axilla    PMH:  High cholesterol  HTN  diabetes    PSH:  None    Medications:  Losartan  Lasix  Metformin  Atorvostatin    Allergies:  NKDA    Social History:  Past smoker, quit in 2022  No ETOH    Family History:  None    ROS:  12 pt review of systems performed and negative asides from HPI    Physical Examination:  Alert, NAD  Strong voice  Breathing comfortably  Palpable right thyroid mass  No adenopathy  No lesions in oral cavity or oropharynx    Procedure:  Fiberoptic laryngoscopy performed showing normal vocal cord mobility      Assessment and plan:  63 year old male with an indeterminate right thyroid nodule. There has been some question about possible medullary thyroid cancer. However, our pathologists favor hurthle cell neoplasm. Calcitonin is also undetectable. He has no evidence of lateral neck adenopathy.    We discussed options including diagnostic right hemithyroidectomy vs  proceeding with total thyroidectomy given the size of the nodule. We discussed pros and cons of each approach. He understands that if we proceed with a hemithyroidectomy and pathology shows malignancy, we may need to return to the OR for completion thyroidectomy to facilitate radioactive iodine.     We discussed the risks of thyroid surgery including but not limited to infection, bleeding, return trips to the OR, 1% risk of permanent recurrent laryngeal nerve injury, 5-10% risk of temporary weakness, hypoparathyroidism. He understands that he will need to start synthroid on POD1 if a total thyroidectomy is pursued.     I will evaluate the central neck intraoperatively and perform a central neck dissection if any adenopathy is encountered.     He will discuss with his family and let us know how he would like to proceed.    Chris Mendez MD    60 minutes spent on the date of the encounter in chart review, patient visit, review of tests, documentation and/or discussion with other providers about the issues documented above asides from time spent doing flexible laryngoscopy.

## 2023-11-03 ENCOUNTER — MYC MEDICAL ADVICE (OUTPATIENT)
Dept: OTOLARYNGOLOGY | Facility: CLINIC | Age: 63
End: 2023-11-03
Payer: COMMERCIAL

## 2023-11-06 NOTE — TELEPHONE ENCOUNTER
Dr. Mendez called patients daughter Claudia on 11/6/23 to review these questions and determine next setps.     Jessica Rey, RN, BSN  RN Care Coordinator, ENT Clinic  [facility]  Direct: [phone]

## 2023-11-07 ENCOUNTER — PATIENT OUTREACH (OUTPATIENT)
Dept: OTOLARYNGOLOGY | Facility: CLINIC | Age: 63
End: 2023-11-07
Payer: COMMERCIAL

## 2023-11-07 ENCOUNTER — PREP FOR PROCEDURE (OUTPATIENT)
Dept: OTOLARYNGOLOGY | Facility: CLINIC | Age: 63
End: 2023-11-07
Payer: COMMERCIAL

## 2023-11-07 DIAGNOSIS — E07.9 THYROID MASS: Primary | ICD-10-CM

## 2023-11-07 NOTE — PROGRESS NOTES
"Returned voicemail to patient's daughter (Claudia) regarding scheduling surgery for patient. She said patient would like to do \"half\" thyroid surgery versus the total thyroidectomy. I let her know I would confirm case request with Dr. Mendez and surgery schedulers would reach out to her for scheduling. She has direct phone number if she has further questions or concerns.     Jessica Rey, RN, BSN  RN Care Coordinator, ENT Clinic  [facility]  Direct: [phone]     "

## 2023-11-08 ENCOUNTER — TELEPHONE (OUTPATIENT)
Dept: OTOLARYNGOLOGY | Facility: CLINIC | Age: 63
End: 2023-11-08
Payer: COMMERCIAL

## 2023-11-08 NOTE — TELEPHONE ENCOUNTER
Patient is scheduled for surgery with Dr. Mendez.     Spoke with: Patient's daughter, Claudia.     Date of Surgery: 1/09/24    Location: [facility] OR     Pre op with Provider: DEMI     H&P: Patient will schedule pre op at [facility] in [place]. Patients daughter informed pre op will need to be done within 30 days of scheduled surgery date.     Additional imaging/appointments: Patient is scheduled for a post op with Dr. Mendez on 1/15/23 at 10:20 AM.     Surgery packet: Will mail packet, confirmed with patients daughter address in chart works best. Patients daughter made aware arrival time will not be listed within packet. Soap will be sent as well.      Additional comments: Writer informed patients daughter a pre op nurse will call a few days prior to surgery to go over further details/give arrival time.         Claudia Mazariegos on 11/8/2023 at 12:15 PM

## 2023-11-15 ENCOUNTER — MEDICAL CORRESPONDENCE (OUTPATIENT)
Dept: HEALTH INFORMATION MANAGEMENT | Facility: CLINIC | Age: 63
End: 2023-11-15
Payer: COMMERCIAL

## 2023-11-28 ENCOUNTER — PRE VISIT (OUTPATIENT)
Dept: ENDOCRINOLOGY | Facility: CLINIC | Age: 63
End: 2023-11-28

## 2023-12-12 ENCOUNTER — OFFICE VISIT (OUTPATIENT)
Dept: FAMILY MEDICINE | Facility: CLINIC | Age: 63
End: 2023-12-12
Payer: COMMERCIAL

## 2023-12-12 VITALS
WEIGHT: 170.4 LBS | RESPIRATION RATE: 18 BRPM | DIASTOLIC BLOOD PRESSURE: 84 MMHG | BODY MASS INDEX: 27.38 KG/M2 | SYSTOLIC BLOOD PRESSURE: 120 MMHG | TEMPERATURE: 97.6 F | HEART RATE: 96 BPM | HEIGHT: 66 IN | OXYGEN SATURATION: 100 %

## 2023-12-12 DIAGNOSIS — I10 ESSENTIAL HYPERTENSION: ICD-10-CM

## 2023-12-12 DIAGNOSIS — Z11.59 NEED FOR HEPATITIS C SCREENING TEST: ICD-10-CM

## 2023-12-12 DIAGNOSIS — Z01.818 PREOPERATIVE CLEARANCE: Primary | ICD-10-CM

## 2023-12-12 DIAGNOSIS — R73.03 PREDIABETES: ICD-10-CM

## 2023-12-12 DIAGNOSIS — M79.5 FOREIGN BODY (FB) IN SOFT TISSUE: ICD-10-CM

## 2023-12-12 DIAGNOSIS — Z11.4 SCREENING FOR HIV (HUMAN IMMUNODEFICIENCY VIRUS): ICD-10-CM

## 2023-12-12 DIAGNOSIS — D58.2 HEMOGLOBIN E DISEASE (H): ICD-10-CM

## 2023-12-12 DIAGNOSIS — R71.8 MICROCYTIC RED BLOOD CELLS: ICD-10-CM

## 2023-12-12 PROBLEM — M79.89 BILATERAL HAND SWELLING: Status: ACTIVE | Noted: 2023-09-08

## 2023-12-12 PROBLEM — D56.9 THALASSEMIA: Status: ACTIVE | Noted: 2021-06-11

## 2023-12-12 PROBLEM — Z87.891 FORMER TOBACCO USE: Status: ACTIVE | Noted: 2021-06-02

## 2023-12-12 PROBLEM — R80.9 MICROALBUMINURIA: Status: ACTIVE | Noted: 2021-06-21

## 2023-12-12 PROBLEM — E07.9 THYROID MASS: Status: ACTIVE | Noted: 2023-09-08

## 2023-12-12 PROBLEM — R41.3 MEMORY CHANGE: Status: ACTIVE | Noted: 2022-09-30

## 2023-12-12 PROBLEM — C73 THYROID CANCER (H): Status: ACTIVE | Noted: 2023-12-12

## 2023-12-12 PROBLEM — E78.5 HYPERLIPIDEMIA LDL GOAL <70: Status: ACTIVE | Noted: 2021-06-11

## 2023-12-12 PROBLEM — C73 MALIGNANT NEOPLASM OF THYROID GLAND (H): Status: ACTIVE | Noted: 2023-09-25

## 2023-12-12 PROBLEM — G47.00 INSOMNIA: Status: ACTIVE | Noted: 2021-06-11

## 2023-12-12 LAB
BASOPHILS # BLD AUTO: 0.1 10E3/UL (ref 0–0.2)
BASOPHILS NFR BLD AUTO: 2 %
EOSINOPHIL # BLD AUTO: 0.7 10E3/UL (ref 0–0.7)
EOSINOPHIL NFR BLD AUTO: 10 %
ERYTHROCYTE [DISTWIDTH] IN BLOOD BY AUTOMATED COUNT: 19.3 % (ref 10–15)
HCT VFR BLD AUTO: 41.6 % (ref 40–53)
HGB BLD-MCNC: 13.5 G/DL (ref 13.3–17.7)
IMM GRANULOCYTES # BLD: 0 10E3/UL
IMM GRANULOCYTES NFR BLD: 0 %
LYMPHOCYTES # BLD AUTO: 2.6 10E3/UL (ref 0.8–5.3)
LYMPHOCYTES NFR BLD AUTO: 37 %
MCH RBC QN AUTO: 19.9 PG (ref 26.5–33)
MCHC RBC AUTO-ENTMCNC: 32.5 G/DL (ref 31.5–36.5)
MCV RBC AUTO: 61 FL (ref 78–100)
MONOCYTES # BLD AUTO: 0.6 10E3/UL (ref 0–1.3)
MONOCYTES NFR BLD AUTO: 9 %
NEUTROPHILS # BLD AUTO: 3 10E3/UL (ref 1.6–8.3)
NEUTROPHILS NFR BLD AUTO: 42 %
NRBC # BLD AUTO: 0 10E3/UL
NRBC BLD AUTO-RTO: 0 /100
PLATELET # BLD AUTO: 225 10E3/UL (ref 150–450)
RBC # BLD AUTO: 6.78 10E6/UL (ref 4.4–5.9)
WBC # BLD AUTO: 7.1 10E3/UL (ref 4–11)

## 2023-12-12 PROCEDURE — 87389 HIV-1 AG W/HIV-1&-2 AB AG IA: CPT | Performed by: INTERNAL MEDICINE

## 2023-12-12 PROCEDURE — 85025 COMPLETE CBC W/AUTO DIFF WBC: CPT | Performed by: INTERNAL MEDICINE

## 2023-12-12 PROCEDURE — 99204 OFFICE O/P NEW MOD 45 MIN: CPT | Performed by: INTERNAL MEDICINE

## 2023-12-12 PROCEDURE — 86803 HEPATITIS C AB TEST: CPT | Performed by: INTERNAL MEDICINE

## 2023-12-12 PROCEDURE — 36415 COLL VENOUS BLD VENIPUNCTURE: CPT | Performed by: INTERNAL MEDICINE

## 2023-12-12 ASSESSMENT — PAIN SCALES - GENERAL: PAINLEVEL: NO PAIN (0)

## 2023-12-12 NOTE — COMMUNITY RESOURCES LIST (ENGLISH)
12/12/2023   [facility]  N/A  For questions about this resource list or additional care needs, please contact your primary care clinic or care manager.  Phone: [phone]   Email: N/A   Address: [address] [place] [address]   Hours: N/A        Food and Nutrition       Food pantry  1  People Reaching Out to Other People (PROP) Distance: 4.24 miles      Pickup   [address] [place] [address]  Language: English, Swiss  Hours: Mon - Tue 9:30 AM - 1:00 PM , Wed 3:00 PM - 6:30 PM , Thu - Fri 9:30 AM - 1:00 PM  Fees: Free   Phone: (535) 870-7248 Email: prop@[facility].[employer] Website: http://www.[facility].[employer]     2  [employer] Distance: 5.83 miles      Pickup   [address] [place] [address]  Language: English  Hours: Mon 9:00 AM - 6:30 PM , Tue - Wed 9:00 AM - 3:30 PM , Thu 9:00 AM - 12:30 PM , Fri 9:00 AM - 3:30 PM , Sat 9:00 AM - 12:00 PM  Fees: Free   Phone: (422) 447-9268 Email: info@[employer].org Website: http://[employer].org/     SNAP application assistance  3  People Reaching Out to Other People (PROP) Distance: 4.24 miles      In-Person, Phone/Virtual   [address] [place] [address]  Language: English, Swiss  Hours: Mon - Tue 9:30 AM - 1:00 PM , Wed 3:00 PM - 6:30 PM , Thu - Fri 9:30 AM - 1:00 PM  Fees: Free   Phone: (246) 307-8966 Email: prop@[facility].org Website: http://www.[facility].[employer]     4  Community Action Partnership (CAP)  Dorian Posadas  Reece Love [facility] Distance: 5.61 miles      In-Person   [address] [facility] [place] [address]  Language: English, Swiss  Hours: Mon - Fri 8:00 AM - 8:00 PM  Fees: Free   Phone: (203) 679-4085 Email: info@[employer] Website: https://www.[employer].org/     Soup kitchen or free meals  5  [facility] Distance: 7.15 miles      [address] [place] [address]  Language:  English  Hours: Mon - Tue 5:30 PM - 6:30 PM , Sat - Sun 5:30 PM - 6:30 PM  Fees: Free   Phone: (480) 905-5954 Email: office@[employer] Website: https://www.[employer]     6  [facility] & Fishes Distance: 11.21 miles      [facility]   [address] [place] [address]  Language: English, French  Hours: Sat 5:30 PM - 6:30 PM  Fees: Free   Phone: (832) 998-9116 Email: info@[employer] [employer] Website: https://www.[employer] [facility] [employer]/          Important Numbers & Websites       Emergency Services   911  [facility] Services   311  Poison Control   (704) 333-3283  Suicide Prevention Lifeline   (923) 799-2571 (TALK)  Child Abuse Hotline   (630) 555-6652 (4-A-Child)  Sexual Assault Hotline   (756) 989-3380 (HOPE)  National Runaway Safeline   (209) 759-9594 (RUNAWAY)  All-Options Talkline   (623) 968-9809  Substance Abuse Referral   (356) 855-9090 (HELP)

## 2023-12-12 NOTE — PATIENT INSTRUCTIONS
As discussed , will do pertinent work up for this Trigger finger Clearance which is under local anaesthesia .     You will need to make another preop clearance for thyroid surgery which is under general anesthesia and the criteria of work up will be different .     - HOLD (do not take) your METFORMIN on the morning of surgery.    ======================  Preparing for Your Surgery  Getting started  A nurse will call you to review your health history and instructions. They will give you an arrival time based on your scheduled surgery time. Please be ready to share:  Your doctor's clinic name and phone number  Your medical, surgical, and anesthesia history  A list of allergies and sensitivities  A list of medicines, including herbal treatments and over-the-counter drugs  Whether the patient has a legal guardian (ask how to send us the papers in advance)  Please tell us if you're pregnant--or if there's any chance you might be pregnant. Some surgeries may injure a fetus (unborn baby), so they require a pregnancy test. Surgeries that are safe for a fetus don't always need a test, and you can choose whether to have one.   If you have a child who's having surgery, please ask for a copy of Preparing for Your Child's Surgery.    Preparing for surgery  Within 10 to 30 days of surgery: Have a pre-op exam (sometimes called an H&P, or History and Physical). This can be done at a clinic or pre-operative center.  If you're having a , you may not need this exam. Talk to your care team.  At your pre-op exam, talk to your care team about all medicines you take. If you need to stop any medicines before surgery, ask when to start taking them again.  We do this for your safety. Many medicines can make you bleed too much during surgery. Some change how well surgery (anesthesia) drugs work.  Call your insurance company to let them know you're having surgery. (If you don't have insurance, call [phone].)  Call your clinic if  there's any change in your health. This includes signs of a cold or flu (sore throat, runny nose, cough, rash, fever). It also includes a scrape or scratch near the surgery site.  If you have questions on the day of surgery, call your hospital or surgery center.  Eating and drinking guidelines  For your safety: Unless your surgeon tells you otherwise, follow the guidelines below.  Eat and drink as usual until 8 hours before you arrive for surgery. After that, no food or milk.  Drink clear liquids until 2 hours before you arrive. These are liquids you can see through, like water, Gatorade, and Propel Water. They also include plain black coffee and tea (no cream or milk), candy, and breath mints. You can spit out gum when you arrive.  If you drink alcohol: Stop drinking it the night before surgery.  If your care team tells you to take medicine on the morning of surgery, it's okay to take it with a sip of water.  Preventing infection  Shower or bathe the night before and morning of your surgery. Follow the instructions your clinic gave you. (If no instructions, use regular soap.)  Don't shave or clip hair near your surgery site. We'll remove the hair if needed.  Don't smoke or vape the morning of surgery. You may chew nicotine gum up to 2 hours before surgery. A nicotine patch is okay.  Note: Some surgeries require you to completely quit smoking and nicotine. Check with your surgeon.  Your care team will make every effort to keep you safe from infection. We will:  Clean our hands often with soap and water (or an alcohol-based hand rub).  Clean the skin at your surgery site with a special soap that kills germs.  Give you a special gown to keep you warm. (Cold raises the risk of infection.)  Wear special hair covers, masks, gowns and gloves during surgery.  Give antibiotic medicine, if prescribed. Not all surgeries need antibiotics.  What to bring on the day of surgery  Photo ID and insurance card  Copy of your health  care directive, if you have one  Glasses and hearing aids (bring cases)  You can't wear contacts during surgery  Inhaler and eye drops, if you use them (tell us about these when you arrive)  CPAP machine or breathing device, if you use them  A few personal items, if spending the night  If you have . . .  A pacemaker, ICD (cardiac defibrillator) or other implant: Bring the ID card.  An implanted stimulator: Bring the remote control.  A legal guardian: Bring a copy of the certified (court-stamped) guardianship papers.  Please remove any jewelry, including body piercings. Leave jewelry and other valuables at home.  If you're going home the day of surgery  You must have a responsible adult drive you home. They should stay with you overnight as well.  If you don't have someone to stay with you, and you aren't safe to go home alone, we may keep you overnight. Insurance often won't pay for this.  After surgery  If it's hard to control your pain or you need more pain medicine, please call your surgeon's office.  Questions?   If you have any questions for your care team, list them here: _________________________________________________________________________________________________________________________________________________________________________ ____________________________________ ____________________________________ ____________________________________  For informational purposes only. Not to replace the advice of your health care provider. Copyright   2003, 2019 [facility]. All rights reserved. Clinically reviewed by Loulou Mejia MD. [employer] (acquired by [employer]) 091031 - REV 12/22.    How to Take Your Medication Before Surgery  - HOLD (do not take) your METFORMIN on the morning of surgery.

## 2023-12-12 NOTE — COMMUNITY RESOURCES LIST (ENGLISH)
12/12/2023   [facility]  N/A  For questions about this resource list or additional care needs, please contact your primary care clinic or care manager.  Phone: [phone]   Email: N/A   Address: [address] [place] [address]   Hours: N/A        Food and Nutrition       Food pantry  1  People Reaching Out to Other People (PROP) Distance: 4.24 miles      Pickup   [address] [place] [address]  Language: English, [place] citizen  Hours: Mon - Tue 9:30 AM - 1:00 PM , Wed 3:00 PM - 6:30 PM , Thu - Fri 9:30 AM - 1:00 PM  Fees: Free   Phone: (150) 460-6268 Email: prop@[employer] Website: http://www.[employer]     2  [employer] Distance: 5.83 miles      Pickup   [address] [facility] [place] [address]  Language: English  Hours: Mon 9:00 AM - 6:30 PM , Tue - Wed 9:00 AM - 3:30 PM , Thu 9:00 AM - 12:30 PM , Fri 9:00 AM - 3:30 PM , Sat 9:00 AM - 12:00 PM  Fees: Free   Phone: (855) 435-2815 Email: info@[employer].org Website: http://[employer].org/     SNAP application assistance  3  People Reaching Out to Other People (PROP) Distance: 4.24 miles      In-Person, Phone/Virtual   [address] [place] [address]  Language: English, [place] citizen  Hours: Mon - Tue 9:30 AM - 1:00 PM , Wed 3:00 PM - 6:30 PM , Thu - Fri 9:30 AM - 1:00 PM  Fees: Free   Phone: (343) 720-2213 Email: prop@[employer].org Website: http://www.[employer]     4  Community Action Partnership (CAP)  Dorian Posadas  Reece Love [facility] Distance: 5.61 miles      In-Person   [address] [facility] [place] [address]  Language: English, [place] citizen  Hours: Mon - Fri 8:00 AM - 8:00 PM  Fees: Free   Phone: (669) 451-5706 Email: info@[employer] Website: https://www.[employer].org/     Soup kitchen or free meals  5  [facility] Distance: 7.15 miles      [address] [place] [address]  Language:  English  Hours: Mon - Tue 5:30 PM - 6:30 PM , Sat - Sun 5:30 PM - 6:30 PM  Fees: Free   Phone: (168) 304-6406 Email: office@[employer] Website: https://www.[employer]     6  [facility] & Fishes Distance: 11.21 miles      [facility]   [address] [place] [address]  Language: English, Maori  Hours: Sat 5:30 PM - 6:30 PM  Fees: Free   Phone: (210) 161-9434 Email: info@[facility].[employer] Website: https://www.[facility].[employer]/          Important Numbers & Websites       Emergency Services   911  [facility] Services   311  Poison Control   (772) 790-5240  Suicide Prevention Lifeline   (692) 239-4240 (TALK)  Child Abuse Hotline   (731) 569-2032 (4-A-Child)  Sexual Assault Hotline   (131) 223-8550 (HOPE)  National Runaway Safeline   (841) 887-3295 (RUNAWAY)  All-Options Talkline   (867) 202-8115  Substance Abuse Referral   (882) 188-7586 (HELP)

## 2023-12-12 NOTE — PROGRESS NOTES
[address] [address]  Phone: [phone]  Primary Provider: Frances Mooney  Pre-op Performing Provider: APARNA NARVAEZ        PREOPERATIVE EVALUATION:  Today's date: 12/12/2023    Brandan is a 63 year old, presenting for the following:  Pre-Op Exam (TRIGGER FINGER )        12/12/2023     1:35 PM   Additional Questions   Roomed by Milagro MAYFIELD       Surgical Information:  Surgery/Procedure: RELEASE, RIGHT MIDDLE TRIGGER FINGER and REMOVAL, FOREIGN BODY, RIGHT MIDDLE FINGER   Surgery Location: [facility] OR  Surgeon: Umang Oneal MD    Surgery Date: 12/15/2023  Time of Surgery: 12:30PM  Where patient plans to recover: At home with family  Fax number for surgical facility: Note does not need to be faxed, will be available electronically in Epic.    Assessment & Plan     The proposed surgical procedure is considered LOW risk.      Assessment and Plan  1. Preoperative clearance  2. Foreign body (FB) in soft tissue  Pt is here for Preop clearance of RELEASE, RIGHT MIDDLE TRIGGER FINGER , Right & REMOVAL, FOREIGN BODY, RIGHT MIDDLE FINGER under local anaesthesia for Foreign body (FB) in soft tissue.  Does have risk factors of thyroid carcinoma recently diagnosed in October 2023 and scheduled for upcoming surgery of resection in January 2024 , Diabetes on Metformin & HTN on medication. last lab work done for patient in October 2023 showing - Elevated ALT, low TSH, low prolactin, A1c at 6.3%, low MCV at 59 with normal hemoglobin.  Will need CBC.   - CBC with platelets and differential; Future  - CBC with platelets and differential    3. Essential hypertension  Well-controlled, continue current losartan.    4. Hemoglobin E disease (H24)  5. Microcytic red blood cells  Given these concerns will recheck CBC and mentioned above.    6. Need for hepatitis C screening test  - Hepatitis C Screen Reflex to HCV RNA Quant and Genotype; Future  - Hepatitis C  Screen Reflex to HCV RNA Quant and Genotype    7. Screening for HIV (human immunodeficiency virus)  - HIV Antigen Antibody Combo; Future  - HIV Antigen Antibody Combo    8. Prediabetes  Currently on metformin, holding parameters given as mentioned in the after visit summary below.           Please note that this note consists of symbols derived from keyboarding, dictation and/or voice recognition software. As a result, there may be errors in the script that have gone undetected. Please consider this when interpreting information found in this chart.    Patient Instructions   As discussed , will do pertinent work up for this Trigger finger Clearance which is under local anaesthesia .     You will need to make another preop clearance for thyroid surgery which is under general anesthesia and the criteria of work up will be different .     - HOLD (do not take) your METFORMIN on the morning of surgery.    ======================  Preparing for Your Surgery  Getting started  A nurse will call you to review your health history and instructions. They will give you an arrival time based on your scheduled surgery time. Please be ready to share:  Your doctor's clinic name and phone number  Your medical, surgical, and anesthesia history  A list of allergies and sensitivities  A list of medicines, including herbal treatments and over-the-counter drugs  Whether the patient has a legal guardian (ask how to send us the papers in advance)  Please tell us if you're pregnant--or if there's any chance you might be pregnant. Some surgeries may injure a fetus (unborn baby), so they require a pregnancy test. Surgeries that are safe for a fetus don't always need a test, and you can choose whether to have one.   If you have a child who's having surgery, please ask for a copy of Preparing for Your Child's Surgery.    Preparing for surgery  Within 10 to 30 days of surgery: Have a pre-op exam (sometimes called an H&P, or History and Physical).  This can be done at a clinic or pre-operative center.  If you're having a , you may not need this exam. Talk to your care team.  At your pre-op exam, talk to your care team about all medicines you take. If you need to stop any medicines before surgery, ask when to start taking them again.  We do this for your safety. Many medicines can make you bleed too much during surgery. Some change how well surgery (anesthesia) drugs work.  Call your insurance company to let them know you're having surgery. (If you don't have insurance, call [phone].)  Call your clinic if there's any change in your health. This includes signs of a cold or flu (sore throat, runny nose, cough, rash, fever). It also includes a scrape or scratch near the surgery site.  If you have questions on the day of surgery, call your hospital or surgery center.  Eating and drinking guidelines  For your safety: Unless your surgeon tells you otherwise, follow the guidelines below.  Eat and drink as usual until 8 hours before you arrive for surgery. After that, no food or milk.  Drink clear liquids until 2 hours before you arrive. These are liquids you can see through, like water, Gatorade, and Propel Water. They also include plain black coffee and tea (no cream or milk), candy, and breath mints. You can spit out gum when you arrive.  If you drink alcohol: Stop drinking it the night before surgery.  If your care team tells you to take medicine on the morning of surgery, it's okay to take it with a sip of water.  Preventing infection  Shower or bathe the night before and morning of your surgery. Follow the instructions your clinic gave you. (If no instructions, use regular soap.)  Don't shave or clip hair near your surgery site. We'll remove the hair if needed.  Don't smoke or vape the morning of surgery. You may chew nicotine gum up to 2 hours before surgery. A nicotine patch is okay.  Note: Some surgeries require you to completely quit smoking and  nicotine. Check with your surgeon.  Your care team will make every effort to keep you safe from infection. We will:  Clean our hands often with soap and water (or an alcohol-based hand rub).  Clean the skin at your surgery site with a special soap that kills germs.  Give you a special gown to keep you warm. (Cold raises the risk of infection.)  Wear special hair covers, masks, gowns and gloves during surgery.  Give antibiotic medicine, if prescribed. Not all surgeries need antibiotics.  What to bring on the day of surgery  Photo ID and insurance card  Copy of your health care directive, if you have one  Glasses and hearing aids (bring cases)  You can't wear contacts during surgery  Inhaler and eye drops, if you use them (tell us about these when you arrive)  CPAP machine or breathing device, if you use them  A few personal items, if spending the night  If you have . . .  A pacemaker, ICD (cardiac defibrillator) or other implant: Bring the ID card.  An implanted stimulator: Bring the remote control.  A legal guardian: Bring a copy of the certified (court-stamped) guardianship papers.  Please remove any jewelry, including body piercings. Leave jewelry and other valuables at home.  If you're going home the day of surgery  You must have a responsible adult drive you home. They should stay with you overnight as well.  If you don't have someone to stay with you, and you aren't safe to go home alone, we may keep you overnight. Insurance often won't pay for this.  After surgery  If it's hard to control your pain or you need more pain medicine, please call your surgeon's office.  Questions?   If you have any questions for your care team, list them here: _________________________________________________________________________________________________________________________________________________________________________ ____________________________________ ____________________________________  ____________________________________  For informational purposes only. Not to replace the advice of your health care provider. Copyright   2003, 2019 [facility]. All rights reserved. Clinically reviewed by Loulou Mejia MD. [email or web address] [id number] - REV 12/22.    How to Take Your Medication Before Surgery  - HOLD (do not take) your METFORMIN on the morning of surgery.    Return in about 2 weeks (around 12/26/2023), or if symptoms worsen or fail to improve.    Dagmar Thacker MD  [facility] [place]        Possible Sleep Apnea: Patient denies any sleep apnea.       Risks and Recommendations:  The patient has the following additional risks and recommendations for perioperative complications:  Diabetes:  - Patient is not on insulin therapy: regular NPO guidelines can be followed.     Antiplatelet or Anticoagulation Medication Instructions:   - Patient is on no antiplatelet or anticoagulation medications.    Additional Medication Instructions:  Patient is to take all scheduled medications on the day of surgery EXCEPT for modifications listed below:   - metformin: HOLD day of surgery.    RECOMMENDATION:  APPROVAL GIVEN to proceed with proposed procedure, without further diagnostic evaluation.    Review of external notes as documented elsewhere in note  30 minutes spent by me on the date of the encounter doing chart review, review of outside records, review of test results, interpretation of tests, patient visit, and documentation       Subjective       HPI related to upcoming procedure:     Pt is new to  PCP , follows [facility] as PCP.         12/12/2023     1:30 PM   Preop Questions   1. Have you ever had a heart attack or stroke? No   2. Have you ever had surgery on your heart or blood vessels, such as a stent placement, a coronary artery bypass, or surgery on an artery in your head, neck, heart, or legs? No   3. Do you have chest pain with activity? UNKNOWN    4. Do you  have a history of  heart failure? No   5. Do you currently have a cold, bronchitis or symptoms of other infection? No   6. Do you have a cough, shortness of breath, or wheezing? No   7. Do you or anyone in your family have previous history of blood clots? UNKNOWN   8. Do you or does anyone in your family have a serious bleeding problem such as prolonged bleeding following surgeries or cuts? UNKNOWN    9. Have you ever had problems with anemia or been told to take iron pills? No   10. Have you had any abnormal blood loss such as black, tarry or bloody stools? No   11. Have you ever had a blood transfusion? YES    11a. Have you ever had a transfusion reaction? No   12. Are you willing to have a blood transfusion if it is medically needed before, during, or after your surgery? Yes   13. Have you or any of your relatives ever had problems with anesthesia? No   14. Do you have sleep apnea, excessive snoring or daytime drowsiness? YES    14a. Do you have a CPAP machine? No   15. Do you have any artifical heart valves or other implanted medical devices like a pacemaker, defibrillator, or continuous glucose monitor? No   16. Do you have artificial joints? No   17. Are you allergic to latex? No       Health Care Directive:  Patient does not have a Health Care Directive or Living Will:  N/A     Preoperative Review of :   reviewed - no record of controlled substances prescribed.      Status of Chronic Conditions:  See problem list for active medical problems.  Problems all longstanding and stable, except as noted/documented.  See ROS for pertinent symptoms related to these conditions.    Review of Systems  CONSTITUTIONAL: NEGATIVE for fever, chills, change in weight  INTEGUMENTARY/SKIN: NEGATIVE for worrisome rashes, moles or lesions  EYES: NEGATIVE for vision changes or irritation  ENT/MOUTH: NEGATIVE for ear, mouth and throat problems  RESP: NEGATIVE for significant cough or SOB  CV: NEGATIVE for chest pain,  palpitations or peripheral edema  GI: NEGATIVE for nausea, abdominal pain, heartburn, or change in bowel habits  : NEGATIVE for frequency, dysuria, or hematuria  MUSCULOSKELETAL: NEGATIVE for significant arthralgias or myalgia  NEURO: NEGATIVE for weakness, dizziness or paresthesias  ENDOCRINE: NEGATIVE for temperature intolerance, skin/hair changes  HEME: NEGATIVE for bleeding problems  PSYCHIATRIC: NEGATIVE for changes in mood or affect    Patient Active Problem List    Diagnosis Date Noted    Microcytic red blood cells 12/12/2023     Priority: Medium    Thyroid cancer (H) 12/12/2023     Priority: Medium    Foreign body (FB) in soft tissue 10/17/2023     Priority: Medium     Last Assessment & Plan: Formatting of this note might be different from the original. Filled out short term disability for them, he has tried to continue using his hands for repetitive tasks at home but he's unable to. Recommended no repetitive movements with hands, can see if from a fatigue standpoint how he does and revisit it. Ultimately will benefit from thyroidectomy. He's had a lot of news and they are concerned about his cancer diagnosis that we still don't fully know the extent, and I do wonder if depression is a part of this. Having a language barrier also makes this more difficult. - Would benefit from close follow up with PCP or myself if they're willing and further probing into anxiety/depression as may be key contributor to fatigue (v possible metastatic malignancy) - [facility] planning to remove foreign body, likely in December they report      Hemoglobin E disease (H24) 10/06/2023     Priority: Medium    Malignant neoplasm of thyroid gland (H) 09/25/2023     Priority: Medium    Bilateral hand swelling 09/08/2023     Priority: Medium     Last Assessment & Plan: Formatting of this note might be different from the original. Labs pending, chest x-ray pending, US of new neck mass pending Unclear etiology on exam Treatment plan based on  results, could consider a diuretic but need to determine the cause of the swelling      Thyroid mass 09/08/2023     Priority: Medium     Last Assessment & Plan: Formatting of this note might be different from the original. Ideally they would be able to get thyroidectomy and PET scan as ordered by onc, but reasonably they're frustrated this has been denied and they've been unable to get it. He has not yet had abdominal imaging, and discussed it's something we could do if they're not able to get the PET scan. - PET scan if they're able - CT Abdomen/Pelvis w/ ordered if not able to get PET - Agree with seeing ENT, they're going to [place] for this (And most of their cares), discussed would refer to ENT here if they desired      Memory change 09/30/2022     Priority: Medium     Last Assessment & Plan: Formatting of this note might be different from the original. Discussed neurology referral, deferred for now Recommend working up to a daily 20 min walk      Microalbuminuria 06/21/2021     Priority: Medium     Formatting of this note might be different from the original. Impression - [id number]: Start losartan 25 mg once daily Last Assessment & Plan: Formatting of this note might be different from the original.   Recheck urine test.      Prediabetes 06/21/2021     Priority: Medium     Last Assessment & Plan: Formatting of this note might be different from the original.   Due to the thalassemia the A1C may not be accurate and so the numbers may be an underestimate of the true average blood sugars.   Would recommend doing a CGM today to assess true blood sugars , especially given that you already have microalbuminuria.      Hyperlipidemia LDL goal <70 06/11/2021     Priority: Medium     Formatting of this note might be different from the original. Impression - [id number]: LDL cholesterol is elevated and has a calculated AHA CV risk of 13.4% - start atorvastatin 40 mg at night with goal LDL <100. Recheck cholesterol with next  lab work Last Assessment & Plan: Formatting of this note might be different from the original. Annual labs today with hand swelling work up      Insomnia 2021     Priority: Medium     Formatting of this note might be different from the original. 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 Suspect thalassemia (persistent microcytosis, normal iron studies, elevated ferritin) Last Assessment & Plan: Formatting of this note might be different from the original. Checking CBC today      Former tobacco use 2021     Priority: Medium     Formatting of this note might be different from the original. Quit 2022 Last Assessment & Plan: Formatting of this note might be different from the original. Recommend complete cessation      Essential hypertension 2020     Priority: Medium     Last Assessment & Plan: Formatting of this note might be different from the original. Well controlled Annual labs today with hand swelling work up        Past Medical History:   Diagnosis Date    HTN (hypertension)     Insomnia     Microalbuminuria     Prediabetes     Thalassemia     Thyroid mass      Past Surgical History:   Procedure Laterality Date    CHOLECYSTECTOMY, LAPOROSCOPIC  2020    INCISION AND DRAINAGE, ABSCESS, SIMPLE Right      Current Outpatient Medications   Medication Sig Dispense Refill    atorvastatin (LIPITOR) 40 MG tablet Take 1 tablet by mouth every evening      celecoxib (CELEBREX) 100 MG capsule Take 100 mg by mouth      losartan (COZAAR) 25 MG tablet Take 1 tablet by mouth daily      metFORMIN (GLUCOPHAGE) 500 MG tablet          No Known Allergies     Social History     Tobacco Use    Smoking status: Former     Types: Cigarettes     Quit date:      Years since quittin.9    Smokeless tobacco: Never  "  Substance Use Topics    Alcohol use: Not on file     Family History   Problem Relation Age of Onset    Verruca vulgaris Father      History   Drug Use Not on file         Objective     /84   Pulse 96   Temp 97.6  F (36.4  C) (Temporal)   Resp 18   Ht 1.684 m (5' 6.3\")   Wt 77.3 kg (170 lb 6.4 oz)   SpO2 100%   BMI 27.26 kg/m      Physical Exam    GENERAL APPEARANCE: healthy, alert and no distress     EYES: EOMI,  PERRL     HENT: ear canals and TM's normal and nose and mouth without ulcers or lesions     NECK: no adenopathy, no asymmetry, masses, or scars and thyroid normal to palpation     RESP: lungs clear to auscultation - no rales, rhonchi or wheezes     CV: regular rates and rhythm, normal S1 S2, no S3 or S4 and no murmur, click or rub     ABDOMEN:  soft, nontender, no HSM or masses and bowel sounds normal     MS: extremities normal- no gross deformities noted, no evidence of inflammation in joints, FROM in all extremities.     SKIN: no suspicious lesions or rashes     NEURO: Normal strength and tone, sensory exam grossly normal, mentation intact and speech normal     PSYCH: mentation appears normal. and affect normal/bright     LYMPHATICS: No cervical adenopathy    No results for input(s): \"HGB\", \"PLT\", \"INR\", \"NA\", \"POTASSIUM\", \"CR\", \"A1C\" in the last 00740 hours.     Diagnostics:  Labs pending at this time.  Results will be reviewed when available.  No results found for this or any previous visit (from the past 720 hour(s)).   No EKG required for low risk surgery (cataract, skin procedure, breast biopsy, etc).    Revised Cardiac Risk Index (RCRI):  The patient has the following serious cardiovascular risks for perioperative complications:   - No serious cardiac risks = 0 points     RCRI Interpretation: 0 points: Class I (very low risk - 0.4% complication rate)         Signed Electronically by: Dagmar Thacker MD  Copy of this evaluation report is provided to requesting physician.      "

## 2023-12-13 LAB
HCV AB SERPL QL IA: NONREACTIVE
HIV 1+2 AB+HIV1 P24 AG SERPL QL IA: NONREACTIVE

## 2023-12-15 ENCOUNTER — TELEPHONE (OUTPATIENT)
Dept: ORTHOPEDICS | Facility: CLINIC | Age: 63
End: 2023-12-15
Payer: COMMERCIAL

## 2023-12-15 ENCOUNTER — HOSPITAL ENCOUNTER (OUTPATIENT)
Facility: AMBULATORY SURGERY CENTER | Age: 63
Discharge: HOME | End: 2023-12-15
Attending: STUDENT IN AN ORGANIZED HEALTH CARE EDUCATION/TRAINING PROGRAM | Admitting: STUDENT IN AN ORGANIZED HEALTH CARE EDUCATION/TRAINING PROGRAM
Payer: COMMERCIAL

## 2023-12-15 ENCOUNTER — TELEPHONE (OUTPATIENT)
Dept: FAMILY MEDICINE | Facility: CLINIC | Age: 63
End: 2023-12-15
Payer: COMMERCIAL

## 2023-12-15 VITALS
HEIGHT: 66 IN | HEART RATE: 77 BPM | WEIGHT: 170.4 LBS | DIASTOLIC BLOOD PRESSURE: 90 MMHG | SYSTOLIC BLOOD PRESSURE: 133 MMHG | TEMPERATURE: 97.9 F | OXYGEN SATURATION: 95 % | RESPIRATION RATE: 14 BRPM | BODY MASS INDEX: 27.38 KG/M2

## 2023-12-15 DIAGNOSIS — M65.339 TRIGGER MIDDLE FINGER, UNSPECIFIED LATERALITY: ICD-10-CM

## 2023-12-15 DIAGNOSIS — M79.5 FOREIGN BODY (FB) IN SOFT TISSUE: Primary | ICD-10-CM

## 2023-12-15 PROCEDURE — 20520 RMVL FB MUSC/TDN SIMPLE: CPT | Mod: LT

## 2023-12-15 PROCEDURE — 26055 INCISE FINGER TENDON SHEATH: CPT | Mod: F7

## 2023-12-15 RX ORDER — LIDOCAINE HYDROCHLORIDE AND EPINEPHRINE 10; 10 MG/ML; UG/ML
10 INJECTION, SOLUTION INFILTRATION; PERINEURAL ONCE
Status: COMPLETED | OUTPATIENT
Start: 2023-12-15 | End: 2023-12-15

## 2023-12-15 RX ORDER — ACETAMINOPHEN 325 MG/1
975 TABLET ORAL ONCE
Status: COMPLETED | OUTPATIENT
Start: 2023-12-15 | End: 2023-12-15

## 2023-12-15 RX ORDER — OXYCODONE HYDROCHLORIDE 5 MG/1
5 TABLET ORAL EVERY 6 HOURS PRN
Qty: 3 TABLET | Refills: 0 | Status: SHIPPED | OUTPATIENT
Start: 2023-12-15 | End: 2023-12-18

## 2023-12-15 RX ADMIN — LIDOCAINE HYDROCHLORIDE AND EPINEPHRINE 10 ML: 10; 10 INJECTION, SOLUTION INFILTRATION; PERINEURAL at 11:58

## 2023-12-15 RX ADMIN — ACETAMINOPHEN 975 MG: 325 TABLET ORAL at 13:00

## 2023-12-15 NOTE — TELEPHONE ENCOUNTER
[facility] Call Center    Phone Message    May a detailed message be left on voicemail: yes     Reason for Call: Other: Daughter calling to schedule other hand surgery with Dr. Oneal, also wondering if there is a way to coordinate with thyroid surgery so that they can both be done on the same day?     Action Taken: Message routed to:  [facility] Jm    Travel Screening: Not Applicable

## 2023-12-15 NOTE — DISCHARGE INSTRUCTIONS
Procedure Performed: Right middle finger trigger finger release  Attending Surgeon: Umang Oneal MD  Date: 12/15/2023    DIAGNOSIS  1. Foreign body (FB) in soft tissue    2. Trigger middle finger, unspecified laterality        MEDICATIONS   Resume all home medications as directed unless otherwise instructed during this hospitalization. If there is any question, double check with your primary care provider.  Start new discharge medications as directed.    Take 1-2 tablets of extra strength Tylenol 500 mg every 6 hours. You may also take 400-600 mg of ibuprofen every 6 hours.    For breakthrough pain use narcotic pain medication as prescribed.    Do not drive or operate machinery while taking narcotic pain medications.   If you are taking other Tylenol containing medicines at home, be sure NOT to exceed 4 gram's (4000 milligrams) of Tylenol per day.   Do NOT exceed 2400 mg of ibuprofen per day.  If you are taking pain medications, be sure to take Colace (docusate sodium) as well to prevent constipation. If constipated, try adding another cathartic or enema.  If nausea and vomiting, call the hospital or seek medical attention.    ACTIVITY   Weight bearing: Non-weight bearing to operative extremity.    DIET  Resume same diet prior to your hospital admission.    WOUND   Leave dressing on until you are seen in clinic for your follow up visit.   Watch for signs and symptoms of infection of your wounds including; pain, redness, swelling, drainage or fever.  If you notice any of these symptoms please call or seek medical attention.    Keep wound clean, dry, and intact.  Do not submerge wounds in water until they are healed. No baths, soaking, swimming, or prolonged water exposure for 4 weeks after surgery.    RETURN   Follow-up with Orthopedic Clinic as directed.     Future Appointments   Date Time Provider Department [place]   12/26/2023  9:30 AM [facility]   12/29/2023  3:00 PM Juana Lambert,  [facility]   1/15/2024 10:20 AM Chris Mendez MD [facility]       Call the [facility] at [phone] during business hours for any symptoms such as:    * Fevers with Temperature greater than 101.5 degrees.   * Pus drainage from wound site.   * Severe pain, not controlled by medication.   * Persistent nausea, vomiting and inablility to tolerate fluids.    If you are receiving care in [place], you may call the Orthopedic clinic at [phone].    FOR URGENT PROBLEMS ONLY, after hours or on weekends call the hospital  at [phone] and ask to speak with the orthopedic resident on call.      [facility]  Home Care Following Your Procedure  Call a doctor if you have signs of infection (fever, growing tenderness at the surgery site, a large amount of drainage or bleeding, severe pain, foul-smelling drainage, redness, swelling).             Tylenol/Acetaminophen Consumption    If you feel your pain relief is insufficient, you may take Tylenol/Acetaminophen in addition to your narcotic pain medication.   Be careful not to exceed 4,000 mg of Tylenol/Acetaminophen in a 24 hour period from all sources.  If you are taking extra strength Tylenol/acetaminophen (500 mg), the maximum dose is 8 tablets in 24 hours.  If you are taking regular strength acetaminophen (325 mg), the maximum dose is 12 tablets in 24 hours.      Your doctor is:       Dr. Umang Oneal, Orthopaedics: [phone]             Or dial [phone] and ask for the resident on call for:  Orthopaedics  For emergency care, call the:  [facility]: [phone] (TTY for hearing impaired: [phone])

## 2023-12-15 NOTE — TELEPHONE ENCOUNTER
General Call    Contacts         Type Contact Phone/Fax    12/15/2023 03:55 PM CST Phone (Incoming) Claudia Alejandra (Emergency Contact) [phone]          Reason for Call: Pre OP    What are your questions or concerns:  Daughter wants to know if another pre op josi is necessary surgery is on 01/09 and he already had a pre op on 12/12 but is confused on wether that really was a pre op or not that day    Date of last appointment with provider: 12/12/2023    Could we send this information to you in [facility] or would you prefer to receive a phone call?:   Patient would prefer a phone call   Okay to leave a detailed message?: Yes at Cell number on file:    Telephone Information:   Mobile [phone]

## 2023-12-15 NOTE — OP NOTE
Patient: Brandan Alejandra  : 1960  MRN: [id number]    DATE OF OPERATION: December 15, 2023      OPERATIVE REPORT       PREOPERATIVE DIAGNOSIS:  1) Foreign body right middle finger  2) Right middle trigger finger     POSTOPERATIVE DIAGNOSIS:  1) Foreign body right middle finger  2) Right middle trigger finger     PROCEDURE:  1) Open release of A1 pulley of the right middle finger  2) Removal of foreign body right middle finger    SURGEON:  Umang Oneal MD     ASSISTANT(S):  Nirav Alcocer MD (PGY4)    ANESTHESIA:  Local: 10cc 1% lidocaine 1:100,000 epinephrine     IMPLANTS:   None    ESTIMATED BLOOD LOSS:  1cc    DVT PROPHYLAXIS:  None    TOURNIQUET TIME:  None    SPECIMENS REMOVED:  None    INTRAOPERATIVE FINDINGS:  Tenosynovitis of the flexor tendons at the A1 pulley of right middle finger. Thin black filament removed from the middle finger consistent in appearance with a fragment of endovascular guidewire.    COMPLICATIONS:  None    DISPOSITION:  Stable to PACU.     INDICATIONS:  Mr. Brandan Alejandra is a 63 year old male with right middle finger pain.  Radiographs demonstrated retention of a radiopaque foreign body in the soft tissues at the ulnar aspect of the middle phalanx of the middle finger.  The patient reported irritation with use of the finger.  He also reported burning with a recent MRI.  The foreign body was visible in the subcutaneous tissues.  He was to have it removed.  He also endorsed triggering of the right middle finger recalcitrant to non-operative measures including injections. Indications for surgery were discussed with the patient in detail.  The patient wished to proceed with trigger finger release and removal of foreign body.    The indications for surgery were discussed with the patient. The benefits, risks, and alternatives of operative management were discussed in detail with the patient. The patient understands that the risks of surgery include, but are not limited to:  infection, bleeding, injury to nearby structures (such as nerves, blood vessels, and tendons), recurrence, persistent triggering, wound healing problems, need for additional surgery, pain, stiffness, scarring, need for rehabilitation, and anesthetic complications.  Patient expressed understanding and elected to proceed with surgery. All questions were answered to the patient's satisfaction.    Consent was obtained for the procedure.     DESCRIPTION OF PROCEDURE IN DETAIL:  The patient was seen  in the preoperative care unit. Patient identity, consent, procedure to be performed, and operative site were verified with the patient. The right middle finger was marked. The skin was cleansed with alcohol and 10cc of local anesthetic mixture was injected into the subcutaneous tissues over the A1 pulley for a digital block.    The patient was brought to the operating room and placed supine on the operating room table. The right upper extremity was placed on an armboard.    The right upper extremity was prepped and draped in the normal sterile fashion. A preoperative timeout was performed to identify the correct patient and procedure and all were in agreement.    Incision was first made on the volar aspect of right middle finger in a transverse fashion in the palmar crease over the A1 pulley. Blunt dissection was taken down to the level of the flexor sheath, identifying and protecting the neurovascular bundle on either side of the sheath.    The A1 pulley in its entirety was visualized. This was sharply incised with a [place] blade, protecting the neurovascular bundle on either side and the tendon below. Proximally the A0 pulley was also divided under direct visualization. A complete release was performed and visualized. The tenosynovitis on the tendons was gently debrided with tenotomy scissors.    The patient was asked to make a fist several times and no triggering was visualized of the finger.     Attention was then turned  to the ulnar aspect of the middle finger.  The dark foreign body was visible directly under the skin on the ulnar aspect of the middle phalanx of the middle finger.  A small superficial incision was made directly over the foreign body.  The foreign body was immediately encountered and retrieved with a hemostat.  It was slightly adhered to the subcutaneous tissues and dissected free with tenotomy scissors.  It had the appearance of a fragment of flexible black guidewire, consistent in appearance with those used for endovascular medical procedures.    This wounds were then thoroughly irrigated. Careful hemostasis was assured with bipolar cautery. The surgical incision was closed with 4-0 monocryl in interrupted buried fashion.  The incision on the ulnar aspect of the middle finger was closed with a single horizontal mattress 4-0 Monocryl suture.    Steri-Strips were applied.  The hand was then placed in a sterile soft dressing.  A Band-Aid was placed over the incision at the middle phalanx.     All needle and sponge counts were correct at the end of the procedure. There were no complications.     The patient was taken to the postoperative recovery unit in stable condition.     I was present and scrubbed for the entire procedure.     POSTOPERATIVE PLAN:  The patient will maintain the postoperative dressing until the postoperative visit in 10-14 days. The dressing will be kept clean and dry. The patient will be 1 lb coffee cup weight bearing of the right hand. We have discussed and instructed the patient on range of motion exercises of the digits and wrist. X-rays of the right middle finger needed at first postoperative visit. He will look to schedule left index trigger finger release in the future.      Umang Oneal MD     Hand, Upper Extremity & Microvascular Surgery  Department of Orthopedic Surgery  [facility]

## 2023-12-15 NOTE — TELEPHONE ENCOUNTER
Pt was seen on 12/12/23 for pre-op appt. Dr. Thacker, does pt need to be seen again for pre-op clearance for 1/9/2024? Or are you okay with addending that pre-op to clear pt for operation? Please advise.    Geraldine Phillips,  [facility]

## 2023-12-17 NOTE — TELEPHONE ENCOUNTER
Yes , we cannot clear for 2 different surgeries since the previous clearance was for Orthopedic surgery .     We will need to make sure no infection on this before we clear for Thyroidectomy surgery which we should do different set of tests including EKG , hence he was already explained on his OV with us and should be seen on 12/26/23 for Thyroidectomy Clearance.     Thank you  Dagmar Thacker MD on 12/16/2023

## 2023-12-18 ENCOUNTER — HOSPITAL ENCOUNTER (OUTPATIENT)
Facility: AMBULATORY SURGERY CENTER | Age: 63
Discharge: HOME | End: 2023-12-18
Attending: STUDENT IN AN ORGANIZED HEALTH CARE EDUCATION/TRAINING PROGRAM | Admitting: STUDENT IN AN ORGANIZED HEALTH CARE EDUCATION/TRAINING PROGRAM

## 2023-12-18 ENCOUNTER — TELEPHONE (OUTPATIENT)
Dept: ORTHOPEDICS | Facility: CLINIC | Age: 63
End: 2023-12-18

## 2023-12-18 DIAGNOSIS — M65.322 TRIGGER FINGER, LEFT INDEX FINGER: Primary | ICD-10-CM

## 2023-12-18 NOTE — TELEPHONE ENCOUNTER
Patient has been scheduled for surgery. Details are below.    Date of Surgery: 03/07/24    Approximate Arrival Time: SURGERY CENTER WILL CALL 3/4 DAYS PRIOR TO CONFIRM A TIME   Surgeon:  DR. CHAVA WOLF     Procedure: RELEASE TRIGGER FINGER, LEFT  Location: [facility] [address]  Surgery Consult: NA  PreOp Physical: NA  PostOp: 03/18/24  Packet Mailed/MyChart Sent: YES  Added to [place]: YES

## 2023-12-18 NOTE — TELEPHONE ENCOUNTER
Patient Contact     Attempt # 1     Was call answered?    No  Left detailed message relaying providers  message that needs to keep 12/16 pre op or different surgery.    On call back: Relay providers message below     Kenzie Martinez RN

## 2023-12-19 ENCOUNTER — TELEPHONE (OUTPATIENT)
Dept: OTOLARYNGOLOGY | Facility: CLINIC | Age: 63
End: 2023-12-19
Payer: COMMERCIAL

## 2023-12-19 NOTE — TELEPHONE ENCOUNTER
"Called and spoke to patient's daughter Claudia regarding questions about surgery. 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 She has direct number for callback if she has further questions.     Jessica Rey, RN, BSN  RN Care Coordinator, ENT Clinic  [facility]  Direct: [phone]    "

## 2023-12-19 NOTE — TELEPHONE ENCOUNTER
[facility] Call Center    Phone Message    May a detailed message be left on voicemail: yes     Reason for Call: Other: The pt's daughter is asking to speak with a nurse about the pt's diagnosis. That was all the details she said. Please call Claudia at [phone]. Thanks.     Action Taken: Message routed to:  [facility] ENT    Travel Screening: Not Applicable

## 2023-12-20 ENCOUNTER — TELEPHONE (OUTPATIENT)
Dept: ENDOCRINOLOGY | Facility: CLINIC | Age: 63
End: 2023-12-20
Payer: COMMERCIAL

## 2023-12-20 NOTE — TELEPHONE ENCOUNTER
Patient call:     Appointment type: New Thyroid Cancer   Provider: Cornell or Clem   Return date: see below   Speciality phone number: [phone]  Additional appointment(s) needed: N/A   Additional notes: Mimi ARELLANO x1   To schedulers: please offer to move forward on schedule with either Dr Nj or Cornell using NON-CALL week open new/JENNIFER (60 minutes) or 2 back to back 30 minute JENNIFER spaces.       Please note that the above appointment(s) will require manual scheduling as they are marked as JENNIFER and will not appear using auto search. Do not schedule the patient if another patient has already been scheduled in the requested appointment slot.     Teresa Arce on 12/20/2023 at 8:25 AM

## 2023-12-20 NOTE — TELEPHONE ENCOUNTER
DX, Referring NOTES: Thyroid Mass; referred by Kamron Nieves NP    For Cancer Patients: Need the original operative and surgical pathology reports and all imaging reports/images related to the disease (includes all thyroid US, nuclear thyroid and total body scans, PET scans, chest CT reports since prior to the diagnosis ).   APPT DATE: 1/23/2024   NOTES (FOR ALL VISITS) STATUS DETAILS   OFFICE NOTES from referring provider Care Everywhere [facility]:  10/18/23 - ENDO OV with Dr. Ochoa   OFFICE NOTES from other specialist Care Everywhere / Internal [facility]:  (JENNY) 1/15/24, 10/30/23 - ENT OV with Dr. Mendez    [facility]:  10/27/23 - PCC OV with Dr. Ramey  10/10/23 - PCC OV with Ja Corona NP  9/8/23 - PCC OV with Penny Garrido NP    [facility]:  10/6/23 - ONC OV with Dr. Lina STAFFORD NOTES N/A    OPERATIVE REPORT  (thyroid, pituitary, adrenal, parathyroid)  (All op notes related to diagnoses) N/A    MEDICATION LIST Internal    IMAGING      MRI (BRAIN) Received [facility]:  9/27/23 - MRI Chest   XR (Chest) Received [facility]:  9/8/23 - XR Chest   CT (HEAD/NECK/CHEST/ABDOMEN) Received [facility]:  9/18/23 - CT Chest  9/15/23 - CT Chest   ULTRASOUND (HEAD/NECK)  * Include FNAs Received / Internal [facility]:  10/30/23 - US Head/Neck    [facility]:  10/23/23 - US Axilla FNA  9/25/23 - US Thyroid FNA  9/12/23 - US Head/Neck/Thyroid   LABS     DIABETES: HBGA1C, CREATININE, FASTING LIPIDS, MICROALBUMIN URINE, POTASSIUM, TSH, T4    THYROID: TSH, T4, CBC, THYRODLONULIN, TOTAL T3, FREE T4, CALCITONIN, CEA Care Everywhere / Internal [facility]:  12/12/23 - CBC    [facility]:  10/18/23 - Calcitonin  10/18/23 - CMP  10/18/23 - Microalbumin  10/18/23 - Parathyroid Hormone  10/18/23 - TSH, T4  9/8/23 - HBGA1C  9/8/23 - Lipid  9/8/23 - Urine Anlysis  9/30/22 - BMP  9/13/21 - Ferritin    [facility]:  4/16/20 - Magnesium   PATHOLOGY REPORTS WITH CASE NUMBER  *Surgical path reports for endocrine organs (ovaries, testes, pancreas, etc) Care  Everywhere   [facility]:  10/23/23 - Axilla FNA (Case: [id number])  9/25/23 - Thyroid FNA (Case: [id number]) ~ consulted by us 10/27/23     Records Requested  12/20/23    Facility  [facility]  Fax:    Outcome * 12/20/23 12:31 PM E-mailed req to [facility] for images to be pushed to [facility] PACs. - Geraldine    * 12/28/23 1:51 PM Images received from [facility] and attached to the patient in PACs. - Geraldine

## 2023-12-21 DIAGNOSIS — M65.322 TRIGGER FINGER, LEFT INDEX FINGER: Primary | ICD-10-CM

## 2023-12-29 ENCOUNTER — TELEPHONE (OUTPATIENT)
Dept: ORTHOPEDICS | Facility: CLINIC | Age: 63
End: 2023-12-29

## 2023-12-29 ENCOUNTER — OFFICE VISIT (OUTPATIENT)
Dept: ORTHOPEDICS | Facility: CLINIC | Age: 63
End: 2023-12-29
Payer: OTHER MISCELLANEOUS

## 2023-12-29 ENCOUNTER — ANCILLARY PROCEDURE (OUTPATIENT)
Dept: GENERAL RADIOLOGY | Facility: CLINIC | Age: 63
End: 2023-12-29
Attending: PHYSICIAN ASSISTANT
Payer: COMMERCIAL

## 2023-12-29 DIAGNOSIS — Z98.890 POST-OPERATIVE STATE: ICD-10-CM

## 2023-12-29 DIAGNOSIS — M65.322 TRIGGER FINGER, LEFT INDEX FINGER: ICD-10-CM

## 2023-12-29 DIAGNOSIS — M65.322 TRIGGER FINGER, LEFT INDEX FINGER: Primary | ICD-10-CM

## 2023-12-29 PROCEDURE — 99024 POSTOP FOLLOW-UP VISIT: CPT | Performed by: PHYSICIAN ASSISTANT

## 2023-12-29 PROCEDURE — 73140 X-RAY EXAM OF FINGER(S): CPT | Mod: RT | Performed by: RADIOLOGY

## 2023-12-29 NOTE — NURSING NOTE
Reason For Visit:   Chief Complaint   Patient presents with    RECHECK     POST OP 12/15/23 RELEASE, RIGHT MIDDLE TRIGGER FINGER (Right)  REMOVAL, FOREIGN BODY, RIGHT MIDDLE FINGER (Right)            Primary MD: Frances Mooney  Ref. MD: est.    Age: 63 year old    ?  No        Pain Assessment  Patient Currently in Pain: Yes  Patient's Stated Pain Goal: 8    Hand Dominance Evaluation  Hand Dominance: Left          QuickDASH Assessment      12/29/2023     2:37 PM   QuickDASH Main   1. Open a tight or new jar Unable   2. Do heavy household chores (e.g., wash walls, floors) Unable   3. Carry a shopping bag or briefcase Unable   4. Wash your back Moderate difficulty   5. Use a knife to cut food Unable   6. Recreational activities in which you take some force or impact through your arm, shoulder or hand (e.g., golf, hammering, tennis, etc.) Unable   7. During the past week, to what extent has your arm, shoulder or hand problem interfered with your normal social activities with family, friends, neighbours or groups Moderately   8. During the past week, were you limited in your work or other regular daily activities as a result of your arm, shoulder or hand problem Very limited   9. Arm, shoulder or hand pain Moderate   10.Tingling (pins and needles) in your arm,shoulder or hand Mild   11. During the past week, how much difficulty have you had sleeping because of the pain in your arm, shoulder or hand Mild difficulty   Quickdash Ability Score 70.45          Current Outpatient Medications   Medication Sig Dispense Refill    atorvastatin (LIPITOR) 40 MG tablet Take 1 tablet by mouth every evening      celecoxib (CELEBREX) 100 MG capsule Take 100 mg by mouth      losartan (COZAAR) 25 MG tablet Take 1 tablet by mouth daily      metFORMIN (GLUCOPHAGE) 500 MG tablet          No Known Allergies    Kandice Cochran

## 2023-12-29 NOTE — LETTER
12/29/2023         RE: Brandan Alejandra  [address]  [place] [address]        Dear Colleague,    Thank you for referring your patient, Brandan Alejandra, to the [facility] [place]. Please see a copy of my visit note below.    Date of Service: Dec 29, 2023    Chief Complaint: Post operative follow up.     Date of Surgery: 12/15/23    Procedure Performed:   1) Open release of A1 pulley of the right middle finger  2) Removal of foreign body right middle finger     Interval events: Brandan Alejandra is a 63 year old male who presents today for a postoperative follow up. He is doing well. No concerns today. Requesting work extension due to soreness and swelling.     The past medical history was reviewed updated in the EMR. This includes medications, surgeries, social history, and review of systems.    Physical examination:  Well-developed, well-nourished and in no acute distress.  Alert and oriented to surroundings.  On examination of the right hand, incisions are well-healed. There is no erythema, drainage, or dehiscence. Swelling is Mild. Sensation is intact in median, radial and ulnar nerve distributions. Patient can actively flex and extend all digits and thumb. The patient is not able to make a full composite fist with tip to palm distance of 2 finger-widths. Fingers are warm and well-perfused. Radial pulse is palpable.     Radiographs: Three views of the right long (middle) finger were obtained and reviewed. These demonstrate interval removal of radiopaque foreign body.     Assessment: 63 year old male s/p Open release of A1 pulley of the right middle finger, Removal of foreign body right middle finger, progressing appropriately.     Plan:  Patient should continue to wash wound with soap and water daily and pat dry.  No immersing the wound in water for prolonged periods such as tub baths or dishwashing without gloves until wound has healed. Do not lift anything heavier than  a coffee cup or do forceful gripping with the operative hand until the wound has healed as this may split the wound open. This will typically take 1-2 more weeks. May use the hand for light activities like eating, shaving, typing, and brushing your teeth. After the wound has completley healed, may progress activity gradually according to comfort level, but heavy lifting (> 10 pounds),  forceful gripping, and weight bearing directly on the palm (such as pushups) are discouraged for the first 6 weeks after surgery to give the area time to heal. Painful activities should be avoided. Reviewed gentle finger ROM exercises. Work note provided for off of work for the next 2 weeks.    Patient is scheduled for a left middle trigger release on March 7th.       TANVI HARKINS PA-C  Orthopaedic Surgery

## 2023-12-29 NOTE — LETTER
December 29, 2023      RE: Brandan Alejandra  [address]  [place] [address]        To whom it may concern:    Brandan Alejandra is under my professional care for his right hand. He  may return to work with the following:     - Light duty, no repetitive hand motions for lifting more than 10 lbs until 1/15/24.   - After 1/15/24 can return to full duty.     Sincerely,    Juana Lambert PA-C

## 2023-12-29 NOTE — TELEPHONE ENCOUNTER
[facility] Call Center    Phone Message    May a detailed message be left on voicemail: yes     Reason for Call: Form or Letter   Type or form/letter needing completion: Work note need to specify return to work date.  Provider: Juana Lambert  Date form needed: before 01/02/2024  Once completed: Please put in MyChart    Action Taken: Message routed to:  [facility] Orthopedics    Travel Screening: Not Applicable

## 2023-12-29 NOTE — TELEPHONE ENCOUNTER
Called Pt to clarify work restrictions, Pt said that Juana Nguyen already called to clarify work restrictions.  Luis JARVIS

## 2023-12-29 NOTE — TELEPHONE ENCOUNTER
The instruction from today's appt, in regard to pt RETURNING TO WORK, states two different messages. Please CALL the pt to discuss/clarify. Thank you.

## 2023-12-29 NOTE — PROGRESS NOTES
Date of Service: Dec 29, 2023    Chief Complaint: Post operative follow up.     Date of Surgery: 12/15/23    Procedure Performed:   1) Open release of A1 pulley of the right middle finger  2) Removal of foreign body right middle finger     Interval events: Brandan Alejandra is a 63 year old male who presents today for a postoperative follow up. He is doing well. No concerns today. Requesting work extension due to soreness and swelling.     The past medical history was reviewed updated in the EMR. This includes medications, surgeries, social history, and review of systems.    Physical examination:  Well-developed, well-nourished and in no acute distress.  Alert and oriented to surroundings.  On examination of the right hand, incisions are well-healed. There is no erythema, drainage, or dehiscence. Swelling is Mild. Sensation is intact in median, radial and ulnar nerve distributions. Patient can actively flex and extend all digits and thumb. The patient is not able to make a full composite fist with tip to palm distance of 2 finger-widths. Fingers are warm and well-perfused. Radial pulse is palpable.     Radiographs: Three views of the right long (middle) finger were obtained and reviewed. These demonstrate interval removal of radiopaque foreign body.     Assessment: 63 year old male s/p Open release of A1 pulley of the right middle finger, Removal of foreign body right middle finger, progressing appropriately.     Plan:  Patient should continue to wash wound with soap and water daily and pat dry.  No immersing the wound in water for prolonged periods such as tub baths or dishwashing without gloves until wound has healed. Do not lift anything heavier than a coffee cup or do forceful gripping with the operative hand until the wound has healed as this may split the wound open. This will typically take 1-2 more weeks. May use the hand for light activities like eating, shaving, typing, and brushing your teeth. After the  wound has completley healed, may progress activity gradually according to comfort level, but heavy lifting (> 10 pounds),  forceful gripping, and weight bearing directly on the palm (such as pushups) are discouraged for the first 6 weeks after surgery to give the area time to heal. Painful activities should be avoided. Reviewed gentle finger ROM exercises. Work note provided for off of work for the next 2 weeks.    Patient is scheduled for a left middle trigger release on March 7th.       TANVI HARKINS PA-C  Orthopaedic Surgery

## 2023-12-31 ENCOUNTER — HEALTH MAINTENANCE LETTER (OUTPATIENT)
Age: 63
End: 2023-12-31

## 2024-01-02 NOTE — TELEPHONE ENCOUNTER
Letter completed per Juana Lambert PA-C on 12-29-23, available to patient through My Chart.  Paloma Jones RN

## 2024-01-05 ENCOUNTER — OFFICE VISIT (OUTPATIENT)
Dept: FAMILY MEDICINE | Facility: CLINIC | Age: 64
End: 2024-01-05
Payer: COMMERCIAL

## 2024-01-05 VITALS
WEIGHT: 171 LBS | TEMPERATURE: 97.3 F | OXYGEN SATURATION: 98 % | HEIGHT: 66 IN | BODY MASS INDEX: 27.48 KG/M2 | SYSTOLIC BLOOD PRESSURE: 128 MMHG | DIASTOLIC BLOOD PRESSURE: 84 MMHG | HEART RATE: 87 BPM

## 2024-01-05 DIAGNOSIS — R73.03 PREDIABETES: ICD-10-CM

## 2024-01-05 DIAGNOSIS — Z01.818 PREOP GENERAL PHYSICAL EXAM: Primary | ICD-10-CM

## 2024-01-05 DIAGNOSIS — E07.9 THYROID MASS: ICD-10-CM

## 2024-01-05 DIAGNOSIS — E78.5 HYPERLIPIDEMIA LDL GOAL <70: ICD-10-CM

## 2024-01-05 DIAGNOSIS — I10 ESSENTIAL HYPERTENSION: ICD-10-CM

## 2024-01-05 PROCEDURE — 99214 OFFICE O/P EST MOD 30 MIN: CPT | Mod: 24 | Performed by: PHYSICIAN ASSISTANT

## 2024-01-05 NOTE — PROGRESS NOTES
[facility]  [address] [facility] [address]  Phone: [phone]  Fax: [phone]  Primary Provider: Frances Mooney  Pre-op Performing Provider: PANFILO PEREZ      PREOPERATIVE EVALUATION:  Today's date: 1/5/2024    Brandan is a 63 year old, presenting for the following:  Pre-Op Exam        1/5/2024     7:44 AM   Additional Questions   Roomed by Tracy RIVAS       Surgical Information:  Surgery/Procedure: Right hemithyroidectomy, possible central neck dissection, possible total thyroidectomy   Surgery Location: [facility] OR  Surgeon: Chris Mendez MD   Surgery Date: 1/9/2024   Time of Surgery: 8 AM  Where patient plans to recover: At home with family  Fax number for surgical facility: Note does not need to be faxed, will be available electronically in Epic.    Assessment & Plan     The proposed surgical procedure is considered INTERMEDIATE risk.    Hyperlipidemia LDL goal <70  stable    Essential hypertension  Well controlled    Preop general physical exam      Thyroid mass      Prediabetes  Well controlled              - No identified additional risk factors other than previously addressed    Antiplatelet or Anticoagulation Medication Instructions:   - Patient is on no antiplatelet or anticoagulation medications.    Additional Medication Instructions:   - ACE/ARB: HOLD on day of surgery (minimum 11 hours for general anesthesia).   - Statins: Continue taking on the day of surgery.    - metformin: HOLD day of surgery.    RECOMMENDATION:  APPROVAL GIVEN to proceed with proposed procedure, without further diagnostic evaluation.        Subjective       HPI related to upcoming procedure: 63 year old male with history of htn, hyperlipidemia, prediabetes who presents for preoperative evaluation.  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         1/5/2024     7:32 AM   Preop Questions   1.  Have you ever had a heart attack or stroke? No   2. Have you ever had surgery on your heart or blood vessels, such as a stent placement, a coronary artery bypass, or surgery on an artery in your head, neck, heart, or legs? No   3. Do you have chest pain with activity? No   4. Do you have a history of  heart failure? No   5. Do you currently have a cold, bronchitis or symptoms of other infection? No   6. Do you have a cough, shortness of breath, or wheezing? No   7. Do you or anyone in your family have previous history of blood clots? No   8. Do you or does anyone in your family have a serious bleeding problem such as prolonged bleeding following surgeries or cuts? No   9. Have you ever had problems with anemia or been told to take iron pills? No   10. Have you had any abnormal blood loss such as black, tarry or bloody stools? No   11. Have you ever had a blood transfusion? No   12. Are you willing to have a blood transfusion if it is medically needed before, during, or after your surgery? Yes   13. Have you or any of your relatives ever had problems with anesthesia? No   14. Do you have sleep apnea, excessive snoring or daytime drowsiness? No   14a. Do you have a CPAP machine? No   15. Do you have any artifical heart valves or other implanted medical devices like a pacemaker, defibrillator, or continuous glucose monitor? No   16. Do you have artificial joints? No   17. Are you allergic to latex? No       Health Care Directive:  Patient does not have a Health Care Directive or Living Will: Discussed advance care planning with patient; however, patient declined at this time.    Preoperative Review of :   reviewed - no record of controlled substances prescribed.          Review of Systems  CONSTITUTIONAL: NEGATIVE for fever, chills, change in weight  INTEGUMENTARY/SKIN: NEGATIVE for worrisome rashes, moles or lesions  EYES: NEGATIVE for vision changes or irritation  ENT/MOUTH: NEGATIVE for ear, mouth and throat  problems  RESP: NEGATIVE for significant cough or SOB  CV: NEGATIVE for chest pain, palpitations or peripheral edema  GI: NEGATIVE for nausea, abdominal pain, heartburn, or change in bowel habits  : NEGATIVE for frequency, dysuria, or hematuria  MUSCULOSKELETAL: NEGATIVE for significant arthralgias or myalgia  NEURO: NEGATIVE for weakness, dizziness or paresthesias  ENDOCRINE: NEGATIVE for temperature intolerance, skin/hair changes  HEME: NEGATIVE for bleeding problems  PSYCHIATRIC: NEGATIVE for changes in mood or affect    Patient Active Problem List    Diagnosis Date Noted    Trigger finger, left index finger 12/18/2023     Priority: Medium    Microcytic red blood cells 12/12/2023     Priority: Medium    Thyroid cancer (H) 12/12/2023     Priority: Medium    Foreign body (FB) in soft tissue 10/17/2023     Priority: Medium     Last Assessment & Plan: Formatting of this note might be different from the original. Filled out short term disability for them, he has tried to continue using his hands for repetitive tasks at home but he's unable to. Recommended no repetitive movements with hands, can see if from a fatigue standpoint how he does and revisit it. Ultimately will benefit from thyroidectomy. He's had a lot of news and they are concerned about his cancer diagnosis that we still don't fully know the extent, and I do wonder if depression is a part of this. Having a language barrier also makes this more difficult. 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 Labs pending, chest x-ray pending, US of new neck mass pending Unclear etiology on exam Treatment plan based on results, could consider a diuretic but need to determine the cause of the swelling      Thyroid mass 09/08/2023     Priority: Medium     Last Assessment & Plan: Formatting of this note might be different from the original. Ideally they would be able to get thyroidectomy and PET scan as ordered by onc, but reasonably they're frustrated this has been denied and they've been unable to get it. He has not yet had abdominal imaging, and discussed it's something we could do if they're not able to get the PET scan. - PET scan if they're able - CT Abdomen/Pelvis w/ ordered if not able to get PET - Agree with seeing ENT, they're going to [place] for this (And most of their cares), discussed would refer to ENT here if they desired      Memory change 09/30/2022     Priority: Medium     Last Assessment & Plan: Formatting of this note might be different from the original. Discussed neurology referral, deferred for now Recommend working up to a daily 20 min walk      Microalbuminuria 06/21/2021     Priority: Medium     Formatting of this note might be different from the original. Impression - [id number]: Start losartan 25 mg once daily Last Assessment & Plan: Formatting of this note might be different from the original.   Recheck urine test.      Prediabetes 06/21/2021     Priority: Medium     Last Assessment & Plan: Formatting of this note might be different from the original.   Due to the thalassemia the A1C may not be accurate and so the numbers may be an underestimate of the true average blood sugars.   Would recommend doing a CGM today to assess true blood sugars , especially given that you already have microalbuminuria.      Hyperlipidemia LDL goal <70 06/11/2021     Priority: Medium     Formatting of this note might be different from the original. Impression - [id number]:  LDL cholesterol is elevated and has a calculated AHA CV risk of 13.4% - start atorvastatin 40 mg at night with goal LDL <100. Recheck cholesterol with next lab work Last Assessment & Plan: Formatting of this note might be different from the original. Annual labs today with hand swelling work up      Insomnia 06/11/2021     Priority: Medium     Formatting of this note might be different from the original. 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 Suspect thalassemia (persistent microcytosis, normal iron studies, elevated ferritin) Last Assessment & Plan: Formatting of this note might be different from the original. Checking CBC today      Former tobacco use 06/02/2021     Priority: Medium     Formatting of this note might be different from the original. Quit Sept 2022 Last Assessment & Plan: Formatting of this note might be different from the original. Recommend complete cessation      Essential hypertension 09/29/2020     Priority: Medium     Last Assessment & Plan: Formatting of this note might be different from the original. Well controlled Annual labs today with hand swelling work up        Past Medical History:   Diagnosis Date    HTN (hypertension)     Insomnia     Microalbuminuria     Prediabetes     Thalassemia     Thyroid mass      Past Surgical History:   Procedure Laterality Date    CHOLECYSTECTOMY, LAPOROSCOPIC  07/16/2020    INCISION AND DRAINAGE, ABSCESS, SIMPLE Right     RELEASE TRIGGER FINGER Right 12/15/2023    Procedure: RELEASE, RIGHT MIDDLE TRIGGER FINGER;  Surgeon: Umang Oneal MD;  Location: [facility] OR    REMOVE FOREIGN BODY FINGER Right 12/15/2023    Procedure: REMOVAL, FOREIGN BODY, RIGHT MIDDLE FINGER;  Surgeon: Umang Oneal MD;  Location: [facility] OR     Current Outpatient Medications   Medication Sig Dispense  "Refill    atorvastatin (LIPITOR) 40 MG tablet Take 1 tablet by mouth every evening      celecoxib (CELEBREX) 100 MG capsule Take 100 mg by mouth      losartan (COZAAR) 25 MG tablet Take 1 tablet by mouth daily      metFORMIN (GLUCOPHAGE) 500 MG tablet          No Known Allergies     Social History     Tobacco Use    Smoking status: Former     Types: Cigarettes     Quit date:      Years since quittin.0    Smokeless tobacco: Never   Substance Use Topics    Alcohol use: Not on file       History   Drug Use Not on file         Objective     /84   Pulse 87   Temp 97.3  F (36.3  C) (Temporal)   Ht 1.684 m (5' 6.3\")   Wt 77.6 kg (171 lb)   SpO2 98%   BMI 27.35 kg/m      Physical Exam    GENERAL APPEARANCE: healthy, alert and no distress     EYES: EOMI,  PERRL     HENT: ear canals and TM's normal and nose and mouth without ulcers or lesions     NECK: no adenopathy, no asymmetry, masses, or scars and thyroid normal to palpation     RESP: lungs clear to auscultation - no rales, rhonchi or wheezes     CV: regular rates and rhythm, normal S1 S2, no S3 or S4 and no murmur, click or rub     ABDOMEN:  soft, nontender, no HSM or masses and bowel sounds normal     MS: extremities normal- no gross deformities noted, no evidence of inflammation in joints, FROM in all extremities.     SKIN: no suspicious lesions or rashes     NEURO: Normal strength and tone, sensory exam grossly normal, mentation intact and speech normal     PSYCH: mentation appears normal. and affect normal/bright     LYMPHATICS: No cervical adenopathy    Recent Labs   Lab Test 23  1429   HGB 13.5           Diagnostics:  No labs were ordered during this visit.   No EKG required, no history of coronary heart disease, significant arrhythmia, peripheral arterial disease or other structural heart disease.    Revised Cardiac Risk Index (RCRI):  The patient has the following serious cardiovascular risks for perioperative complications:   - " No serious cardiac risks = 0 points     RCRI Interpretation: 0 points: Class I (very low risk - 0.4% complication rate)         Signed Electronically by: Perla Hart PA-C  Copy of this evaluation report is provided to requesting physician.

## 2024-01-05 NOTE — PATIENT INSTRUCTIONS
Preparing for Your Surgery  Getting started  A nurse will call you to review your health history and instructions. They will give you an arrival time based on your scheduled surgery time. Please be ready to share:  Your doctor's clinic name and phone number  Your medical, surgical, and anesthesia history  A list of allergies and sensitivities  A list of medicines, including herbal treatments and over-the-counter drugs  Whether the patient has a legal guardian (ask how to send us the papers in advance)  Please tell us if you're pregnant--or if there's any chance you might be pregnant. Some surgeries may injure a fetus (unborn baby), so they require a pregnancy test. Surgeries that are safe for a fetus don't always need a test, and you can choose whether to have one.   If you have a child who's having surgery, please ask for a copy of Preparing for Your Child's Surgery.    Preparing for surgery  Within 10 to 30 days of surgery: Have a pre-op exam (sometimes called an H&P, or History and Physical). This can be done at a clinic or pre-operative center.  If you're having a , you may not need this exam. Talk to your care team.  At your pre-op exam, talk to your care team about all medicines you take. If you need to stop any medicines before surgery, ask when to start taking them again.  We do this for your safety. Many medicines can make you bleed too much during surgery. Some change how well surgery (anesthesia) drugs work.  Call your insurance company to let them know you're having surgery. (If you don't have insurance, call [phone].)  Call your clinic if there's any change in your health. This includes signs of a cold or flu (sore throat, runny nose, cough, rash, fever). It also includes a scrape or scratch near the surgery site.  If you have questions on the day of surgery, call your hospital or surgery center.  Eating and drinking guidelines  For your safety: Unless your surgeon tells you otherwise,  follow the guidelines below.  Eat and drink as usual until 8 hours before you arrive for surgery. After that, no food or milk.  Drink clear liquids until 2 hours before you arrive. These are liquids you can see through, like water, Gatorade, and Propel Water. They also include plain black coffee and tea (no cream or milk), candy, and breath mints. You can spit out gum when you arrive.  If you drink alcohol: Stop drinking it the night before surgery.  If your care team tells you to take medicine on the morning of surgery, it's okay to take it with a sip of water.  Preventing infection  Shower or bathe the night before and morning of your surgery. Follow the instructions your clinic gave you. (If no instructions, use regular soap.)  Don't shave or clip hair near your surgery site. We'll remove the hair if needed.  Don't smoke or vape the morning of surgery. You may chew nicotine gum up to 2 hours before surgery. A nicotine patch is okay.  Note: Some surgeries require you to completely quit smoking and nicotine. Check with your surgeon.  Your care team will make every effort to keep you safe from infection. We will:  Clean our hands often with soap and water (or an alcohol-based hand rub).  Clean the skin at your surgery site with a special soap that kills germs.  Give you a special gown to keep you warm. (Cold raises the risk of infection.)  Wear special hair covers, masks, gowns and gloves during surgery.  Give antibiotic medicine, if prescribed. Not all surgeries need antibiotics.  What to bring on the day of surgery  Photo ID and insurance card  Copy of your health care directive, if you have one  Glasses and hearing aids (bring cases)  You can't wear contacts during surgery  Inhaler and eye drops, if you use them (tell us about these when you arrive)  CPAP machine or breathing device, if you use them  A few personal items, if spending the night  If you have . . .  A pacemaker, ICD (cardiac defibrillator) or other  implant: Bring the ID card.  An implanted stimulator: Bring the remote control.  A legal guardian: Bring a copy of the certified (court-stamped) guardianship papers.  Please remove any jewelry, including body piercings. Leave jewelry and other valuables at home.  If you're going home the day of surgery  You must have a responsible adult drive you home. They should stay with you overnight as well.  If you don't have someone to stay with you, and you aren't safe to go home alone, we may keep you overnight. Insurance often won't pay for this.  After surgery  If it's hard to control your pain or you need more pain medicine, please call your surgeon's office.  Questions?   If you have any questions for your care team, list them here: _________________________________________________________________________________________________________________________________________________________________________ ____________________________________ ____________________________________ ____________________________________  For informational purposes only. Not to replace the advice of your health care provider. Copyright   2003, 2019 [employer]. All rights reserved. Clinically reviewed by Loulou Mejia MD. [employer] [id number] - REV 12/22.    How to Take Your Medication Before Surgery   - losartan: HOLD on day of surgery (minimum 11 hours for general anesthesia).   - atorvastatin: Continue taking on the day of surgery.    - metformin: HOLD day of surgery.

## 2024-01-08 ENCOUNTER — ANESTHESIA EVENT (OUTPATIENT)
Dept: SURGERY | Facility: CLINIC | Age: 64
End: 2024-01-08
Payer: COMMERCIAL

## 2024-01-09 ENCOUNTER — ANESTHESIA (OUTPATIENT)
Dept: SURGERY | Facility: CLINIC | Age: 64
End: 2024-01-09
Payer: COMMERCIAL

## 2024-01-09 ENCOUNTER — HOSPITAL ENCOUNTER (OUTPATIENT)
Facility: CLINIC | Age: 64
Discharge: HOME | End: 2024-01-09
Attending: STUDENT IN AN ORGANIZED HEALTH CARE EDUCATION/TRAINING PROGRAM | Admitting: STUDENT IN AN ORGANIZED HEALTH CARE EDUCATION/TRAINING PROGRAM
Payer: COMMERCIAL

## 2024-01-09 VITALS
RESPIRATION RATE: 15 BRPM | BODY MASS INDEX: 27.4 KG/M2 | HEIGHT: 67 IN | SYSTOLIC BLOOD PRESSURE: 144 MMHG | HEART RATE: 94 BPM | TEMPERATURE: 97.6 F | WEIGHT: 174.6 LBS | OXYGEN SATURATION: 97 % | DIASTOLIC BLOOD PRESSURE: 100 MMHG

## 2024-01-09 DIAGNOSIS — C73 THYROID CANCER (H): Primary | ICD-10-CM

## 2024-01-09 DIAGNOSIS — Z12.5 SCREENING FOR PROSTATE CANCER: ICD-10-CM

## 2024-01-09 LAB
ALBUMIN SERPL BCG-MCNC: 4 G/DL (ref 3.5–5.2)
CALCIUM SERPL-MCNC: 8.4 MG/DL (ref 8.8–10.2)
GLUCOSE BLDC GLUCOMTR-MCNC: 116 MG/DL (ref 70–99)
GLUCOSE BLDC GLUCOMTR-MCNC: 130 MG/DL (ref 70–99)
PTH-INTACT SERPL-MCNC: 11 PG/ML (ref 15–65)

## 2024-01-09 PROCEDURE — 83970 ASSAY OF PARATHORMONE: CPT

## 2024-01-09 PROCEDURE — 36415 COLL VENOUS BLD VENIPUNCTURE: CPT

## 2024-01-09 PROCEDURE — 258N000003 HC RX IP 258 OP 636: Performed by: STUDENT IN AN ORGANIZED HEALTH CARE EDUCATION/TRAINING PROGRAM

## 2024-01-09 PROCEDURE — 370N000017 HC ANESTHESIA TECHNICAL FEE, PER MIN: Performed by: STUDENT IN AN ORGANIZED HEALTH CARE EDUCATION/TRAINING PROGRAM

## 2024-01-09 PROCEDURE — 60240 REMOVAL OF THYROID: CPT | Mod: GC | Performed by: STUDENT IN AN ORGANIZED HEALTH CARE EDUCATION/TRAINING PROGRAM

## 2024-01-09 PROCEDURE — 360N000077 HC SURGERY LEVEL 4, PER MIN: Performed by: STUDENT IN AN ORGANIZED HEALTH CARE EDUCATION/TRAINING PROGRAM

## 2024-01-09 PROCEDURE — 82310 ASSAY OF CALCIUM: CPT

## 2024-01-09 PROCEDURE — 272N000001 HC OR GENERAL SUPPLY STERILE: Performed by: STUDENT IN AN ORGANIZED HEALTH CARE EDUCATION/TRAINING PROGRAM

## 2024-01-09 PROCEDURE — 250N000011 HC RX IP 250 OP 636: Performed by: STUDENT IN AN ORGANIZED HEALTH CARE EDUCATION/TRAINING PROGRAM

## 2024-01-09 PROCEDURE — 88305 TISSUE EXAM BY PATHOLOGIST: CPT | Mod: 26 | Performed by: PATHOLOGY

## 2024-01-09 PROCEDURE — 250N000009 HC RX 250: Performed by: ANESTHESIOLOGY

## 2024-01-09 PROCEDURE — 82962 GLUCOSE BLOOD TEST: CPT

## 2024-01-09 PROCEDURE — 88331 PATH CONSLTJ SURG 1 BLK 1SPC: CPT | Mod: 26 | Performed by: PATHOLOGY

## 2024-01-09 PROCEDURE — 88342 IMHCHEM/IMCYTCHM 1ST ANTB: CPT | Mod: TC | Performed by: STUDENT IN AN ORGANIZED HEALTH CARE EDUCATION/TRAINING PROGRAM

## 2024-01-09 PROCEDURE — 250N000009 HC RX 250: Performed by: STUDENT IN AN ORGANIZED HEALTH CARE EDUCATION/TRAINING PROGRAM

## 2024-01-09 PROCEDURE — 82040 ASSAY OF SERUM ALBUMIN: CPT

## 2024-01-09 PROCEDURE — G0103 PSA SCREENING: HCPCS

## 2024-01-09 PROCEDURE — 88307 TISSUE EXAM BY PATHOLOGIST: CPT | Mod: 26 | Performed by: PATHOLOGY

## 2024-01-09 PROCEDURE — 999N000141 HC STATISTIC PRE-PROCEDURE NURSING ASSESSMENT: Performed by: STUDENT IN AN ORGANIZED HEALTH CARE EDUCATION/TRAINING PROGRAM

## 2024-01-09 PROCEDURE — 88342 IMHCHEM/IMCYTCHM 1ST ANTB: CPT | Mod: 26 | Performed by: PATHOLOGY

## 2024-01-09 PROCEDURE — 250N000013 HC RX MED GY IP 250 OP 250 PS 637

## 2024-01-09 PROCEDURE — 250N000025 HC SEVOFLURANE, PER MIN: Performed by: STUDENT IN AN ORGANIZED HEALTH CARE EDUCATION/TRAINING PROGRAM

## 2024-01-09 PROCEDURE — 710N000010 HC RECOVERY PHASE 1, LEVEL 2, PER MIN: Performed by: STUDENT IN AN ORGANIZED HEALTH CARE EDUCATION/TRAINING PROGRAM

## 2024-01-09 PROCEDURE — 710N000012 HC RECOVERY PHASE 2, PER MINUTE: Performed by: STUDENT IN AN ORGANIZED HEALTH CARE EDUCATION/TRAINING PROGRAM

## 2024-01-09 RX ORDER — OXYCODONE HYDROCHLORIDE 5 MG/1
5-10 TABLET ORAL EVERY 4 HOURS PRN
Qty: 6 TABLET | Refills: 0 | Status: SHIPPED | OUTPATIENT
Start: 2024-01-09 | End: 2024-07-10

## 2024-01-09 RX ORDER — ACETAMINOPHEN 325 MG/1
975 TABLET ORAL EVERY 6 HOURS PRN
Status: DISCONTINUED | OUTPATIENT
Start: 2024-01-09 | End: 2024-01-09 | Stop reason: HOSPADM

## 2024-01-09 RX ORDER — ONDANSETRON 4 MG/1
4 TABLET, ORALLY DISINTEGRATING ORAL
Status: DISCONTINUED | OUTPATIENT
Start: 2024-01-09 | End: 2024-01-09 | Stop reason: HOSPADM

## 2024-01-09 RX ORDER — HALOPERIDOL 5 MG/ML
1 INJECTION INTRAMUSCULAR
Status: DISCONTINUED | OUTPATIENT
Start: 2024-01-09 | End: 2024-01-09 | Stop reason: HOSPADM

## 2024-01-09 RX ORDER — SODIUM CHLORIDE, SODIUM LACTATE, POTASSIUM CHLORIDE, CALCIUM CHLORIDE 600; 310; 30; 20 MG/100ML; MG/100ML; MG/100ML; MG/100ML
INJECTION, SOLUTION INTRAVENOUS CONTINUOUS PRN
Status: DISCONTINUED | OUTPATIENT
Start: 2024-01-09 | End: 2024-01-09

## 2024-01-09 RX ORDER — DEXAMETHASONE SODIUM PHOSPHATE 10 MG/ML
10 INJECTION, SOLUTION INTRAMUSCULAR; INTRAVENOUS ONCE
Status: COMPLETED | OUTPATIENT
Start: 2024-01-09 | End: 2024-01-09

## 2024-01-09 RX ORDER — ACETAMINOPHEN 325 MG/1
650 TABLET ORAL EVERY 4 HOURS PRN
Qty: 50 TABLET | Refills: 0 | Status: SHIPPED | OUTPATIENT
Start: 2024-01-09

## 2024-01-09 RX ORDER — FENTANYL CITRATE 50 UG/ML
INJECTION, SOLUTION INTRAMUSCULAR; INTRAVENOUS PRN
Status: DISCONTINUED | OUTPATIENT
Start: 2024-01-09 | End: 2024-01-09

## 2024-01-09 RX ORDER — ONDANSETRON 4 MG/1
4 TABLET, ORALLY DISINTEGRATING ORAL EVERY 30 MIN PRN
Status: DISCONTINUED | OUTPATIENT
Start: 2024-01-09 | End: 2024-01-09 | Stop reason: HOSPADM

## 2024-01-09 RX ORDER — FENTANYL CITRATE 50 UG/ML
50 INJECTION, SOLUTION INTRAMUSCULAR; INTRAVENOUS EVERY 5 MIN PRN
Status: DISCONTINUED | OUTPATIENT
Start: 2024-01-09 | End: 2024-01-09 | Stop reason: HOSPADM

## 2024-01-09 RX ORDER — HYDROMORPHONE HCL IN WATER/PF 6 MG/30 ML
0.2 PATIENT CONTROLLED ANALGESIA SYRINGE INTRAVENOUS EVERY 5 MIN PRN
Status: DISCONTINUED | OUTPATIENT
Start: 2024-01-09 | End: 2024-01-09 | Stop reason: HOSPADM

## 2024-01-09 RX ORDER — ONDANSETRON 2 MG/ML
INJECTION INTRAMUSCULAR; INTRAVENOUS PRN
Status: DISCONTINUED | OUTPATIENT
Start: 2024-01-09 | End: 2024-01-09

## 2024-01-09 RX ORDER — OXYCODONE HYDROCHLORIDE 5 MG/1
5 TABLET ORAL
Status: COMPLETED | OUTPATIENT
Start: 2024-01-09 | End: 2024-01-09

## 2024-01-09 RX ORDER — SODIUM CHLORIDE, SODIUM LACTATE, POTASSIUM CHLORIDE, CALCIUM CHLORIDE 600; 310; 30; 20 MG/100ML; MG/100ML; MG/100ML; MG/100ML
INJECTION, SOLUTION INTRAVENOUS CONTINUOUS
Status: DISCONTINUED | OUTPATIENT
Start: 2024-01-09 | End: 2024-01-09 | Stop reason: HOSPADM

## 2024-01-09 RX ORDER — AMOXICILLIN 250 MG
1-2 CAPSULE ORAL 2 TIMES DAILY
Qty: 30 TABLET | Refills: 0 | Status: SHIPPED | OUTPATIENT
Start: 2024-01-09

## 2024-01-09 RX ORDER — CALCIUM CARBONATE 500 MG/1
5 TABLET, CHEWABLE ORAL 3 TIMES DAILY
Qty: 300 TABLET | Refills: 0 | Status: SHIPPED | OUTPATIENT
Start: 2024-01-09 | End: 2024-01-23

## 2024-01-09 RX ORDER — HYDROMORPHONE HCL IN WATER/PF 6 MG/30 ML
0.4 PATIENT CONTROLLED ANALGESIA SYRINGE INTRAVENOUS EVERY 5 MIN PRN
Status: DISCONTINUED | OUTPATIENT
Start: 2024-01-09 | End: 2024-01-09 | Stop reason: HOSPADM

## 2024-01-09 RX ORDER — LEVOTHYROXINE SODIUM 137 UG/1
137 TABLET ORAL DAILY
Qty: 90 TABLET | Refills: 0 | Status: SHIPPED | OUTPATIENT
Start: 2024-01-09 | End: 2024-01-23

## 2024-01-09 RX ORDER — LIDOCAINE HYDROCHLORIDE 20 MG/ML
INJECTION, SOLUTION INFILTRATION; PERINEURAL PRN
Status: DISCONTINUED | OUTPATIENT
Start: 2024-01-09 | End: 2024-01-09

## 2024-01-09 RX ORDER — ONDANSETRON 2 MG/ML
4 INJECTION INTRAMUSCULAR; INTRAVENOUS EVERY 30 MIN PRN
Status: DISCONTINUED | OUTPATIENT
Start: 2024-01-09 | End: 2024-01-09 | Stop reason: HOSPADM

## 2024-01-09 RX ORDER — FENTANYL CITRATE 50 UG/ML
25 INJECTION, SOLUTION INTRAMUSCULAR; INTRAVENOUS EVERY 5 MIN PRN
Status: DISCONTINUED | OUTPATIENT
Start: 2024-01-09 | End: 2024-01-09 | Stop reason: HOSPADM

## 2024-01-09 RX ORDER — LIDOCAINE HYDROCHLORIDE AND EPINEPHRINE 10; 10 MG/ML; UG/ML
INJECTION, SOLUTION INFILTRATION; PERINEURAL PRN
Status: DISCONTINUED | OUTPATIENT
Start: 2024-01-09 | End: 2024-01-09 | Stop reason: HOSPADM

## 2024-01-09 RX ORDER — ALBUTEROL SULFATE 0.83 MG/ML
2.5 SOLUTION RESPIRATORY (INHALATION) EVERY 4 HOURS PRN
Status: DISCONTINUED | OUTPATIENT
Start: 2024-01-09 | End: 2024-01-09 | Stop reason: HOSPADM

## 2024-01-09 RX ORDER — LABETALOL HYDROCHLORIDE 5 MG/ML
10 INJECTION, SOLUTION INTRAVENOUS
Status: COMPLETED | OUTPATIENT
Start: 2024-01-09 | End: 2024-01-09

## 2024-01-09 RX ORDER — HYDRALAZINE HYDROCHLORIDE 20 MG/ML
2.5-5 INJECTION INTRAMUSCULAR; INTRAVENOUS EVERY 10 MIN PRN
Status: DISCONTINUED | OUTPATIENT
Start: 2024-01-09 | End: 2024-01-09 | Stop reason: HOSPADM

## 2024-01-09 RX ORDER — ONDANSETRON 4 MG/1
4 TABLET, ORALLY DISINTEGRATING ORAL EVERY 8 HOURS PRN
Qty: 4 TABLET | Refills: 0 | Status: SHIPPED | OUTPATIENT
Start: 2024-01-09

## 2024-01-09 RX ORDER — OXYCODONE HYDROCHLORIDE 5 MG/1
5 TABLET ORAL EVERY 4 HOURS PRN
Status: DISCONTINUED | OUTPATIENT
Start: 2024-01-09 | End: 2024-01-09 | Stop reason: HOSPADM

## 2024-01-09 RX ORDER — PROPOFOL 10 MG/ML
INJECTION, EMULSION INTRAVENOUS PRN
Status: DISCONTINUED | OUTPATIENT
Start: 2024-01-09 | End: 2024-01-09

## 2024-01-09 RX ORDER — ACETAMINOPHEN 325 MG/1
650 TABLET ORAL
Status: DISCONTINUED | OUTPATIENT
Start: 2024-01-09 | End: 2024-01-09 | Stop reason: HOSPADM

## 2024-01-09 RX ADMIN — SODIUM CHLORIDE, POTASSIUM CHLORIDE, SODIUM LACTATE AND CALCIUM CHLORIDE: 600; 310; 30; 20 INJECTION, SOLUTION INTRAVENOUS at 08:17

## 2024-01-09 RX ADMIN — PROPOFOL 50 MG: 10 INJECTION, EMULSION INTRAVENOUS at 08:25

## 2024-01-09 RX ADMIN — OXYCODONE HYDROCHLORIDE 5 MG: 5 TABLET ORAL at 13:32

## 2024-01-09 RX ADMIN — PHENYLEPHRINE HYDROCHLORIDE 200 MCG: 10 INJECTION INTRAVENOUS at 11:20

## 2024-01-09 RX ADMIN — PHENYLEPHRINE HYDROCHLORIDE 100 MCG: 10 INJECTION INTRAVENOUS at 11:08

## 2024-01-09 RX ADMIN — PHENYLEPHRINE HYDROCHLORIDE 100 MCG: 10 INJECTION INTRAVENOUS at 11:31

## 2024-01-09 RX ADMIN — HYDROMORPHONE HYDROCHLORIDE 0.4 MG: 0.2 INJECTION, SOLUTION INTRAMUSCULAR; INTRAVENOUS; SUBCUTANEOUS at 13:09

## 2024-01-09 RX ADMIN — SODIUM CHLORIDE, POTASSIUM CHLORIDE, SODIUM LACTATE AND CALCIUM CHLORIDE: 600; 310; 30; 20 INJECTION, SOLUTION INTRAVENOUS at 12:19

## 2024-01-09 RX ADMIN — FENTANYL CITRATE 50 MCG: 50 INJECTION INTRAMUSCULAR; INTRAVENOUS at 08:36

## 2024-01-09 RX ADMIN — PROPOFOL 150 MG: 10 INJECTION, EMULSION INTRAVENOUS at 08:22

## 2024-01-09 RX ADMIN — FENTANYL CITRATE 50 MCG: 50 INJECTION INTRAMUSCULAR; INTRAVENOUS at 08:22

## 2024-01-09 RX ADMIN — SUGAMMADEX 200 MG: 100 INJECTION, SOLUTION INTRAVENOUS at 11:58

## 2024-01-09 RX ADMIN — ACETAMINOPHEN 650 MG: 325 TABLET, FILM COATED ORAL at 13:32

## 2024-01-09 RX ADMIN — FENTANYL CITRATE 50 MCG: 50 INJECTION INTRAMUSCULAR; INTRAVENOUS at 10:54

## 2024-01-09 RX ADMIN — HYDROMORPHONE HYDROCHLORIDE 0.4 MG: 0.2 INJECTION, SOLUTION INTRAMUSCULAR; INTRAVENOUS; SUBCUTANEOUS at 13:21

## 2024-01-09 RX ADMIN — SODIUM CHLORIDE, POTASSIUM CHLORIDE, SODIUM LACTATE AND CALCIUM CHLORIDE: 600; 310; 30; 20 INJECTION, SOLUTION INTRAVENOUS at 11:21

## 2024-01-09 RX ADMIN — ONDANSETRON 4 MG: 2 INJECTION INTRAMUSCULAR; INTRAVENOUS at 11:45

## 2024-01-09 RX ADMIN — HYDROMORPHONE HYDROCHLORIDE 0.4 MG: 0.2 INJECTION, SOLUTION INTRAMUSCULAR; INTRAVENOUS; SUBCUTANEOUS at 12:42

## 2024-01-09 RX ADMIN — HYDROMORPHONE HYDROCHLORIDE 0.5 MG: 1 INJECTION, SOLUTION INTRAMUSCULAR; INTRAVENOUS; SUBCUTANEOUS at 09:46

## 2024-01-09 RX ADMIN — PHENYLEPHRINE HYDROCHLORIDE 100 MCG: 10 INJECTION INTRAVENOUS at 09:35

## 2024-01-09 RX ADMIN — HYDRALAZINE HYDROCHLORIDE 5 MG: 20 INJECTION INTRAMUSCULAR; INTRAVENOUS at 12:47

## 2024-01-09 RX ADMIN — LIDOCAINE HYDROCHLORIDE 80 MG: 20 INJECTION, SOLUTION INFILTRATION; PERINEURAL at 08:22

## 2024-01-09 RX ADMIN — PHENYLEPHRINE HYDROCHLORIDE 100 MCG: 10 INJECTION INTRAVENOUS at 09:17

## 2024-01-09 RX ADMIN — SODIUM CHLORIDE, POTASSIUM CHLORIDE, SODIUM LACTATE AND CALCIUM CHLORIDE: 600; 310; 30; 20 INJECTION, SOLUTION INTRAVENOUS at 09:00

## 2024-01-09 RX ADMIN — LABETALOL HYDROCHLORIDE 10 MG: 5 INJECTION, SOLUTION INTRAVENOUS at 12:29

## 2024-01-09 RX ADMIN — FENTANYL CITRATE 50 MCG: 50 INJECTION, SOLUTION INTRAMUSCULAR; INTRAVENOUS at 12:28

## 2024-01-09 RX ADMIN — Medication 50 MG: at 08:23

## 2024-01-09 RX ADMIN — PHENYLEPHRINE HYDROCHLORIDE 100 MCG: 10 INJECTION INTRAVENOUS at 10:15

## 2024-01-09 RX ADMIN — FENTANYL CITRATE 50 MCG: 50 INJECTION, SOLUTION INTRAMUSCULAR; INTRAVENOUS at 12:19

## 2024-01-09 RX ADMIN — PHENYLEPHRINE HYDROCHLORIDE 100 MCG: 10 INJECTION INTRAVENOUS at 11:46

## 2024-01-09 RX ADMIN — DEXAMETHASONE SODIUM PHOSPHATE 10 MG: 10 INJECTION, SOLUTION INTRAMUSCULAR; INTRAVENOUS at 08:46

## 2024-01-09 RX ADMIN — HYDROMORPHONE HYDROCHLORIDE 0.4 MG: 0.2 INJECTION, SOLUTION INTRAMUSCULAR; INTRAVENOUS; SUBCUTANEOUS at 13:39

## 2024-01-09 RX ADMIN — PHENYLEPHRINE HYDROCHLORIDE 100 MCG: 10 INJECTION INTRAVENOUS at 11:37

## 2024-01-09 RX ADMIN — HYDROMORPHONE HYDROCHLORIDE 0.4 MG: 0.2 INJECTION, SOLUTION INTRAMUSCULAR; INTRAVENOUS; SUBCUTANEOUS at 12:58

## 2024-01-09 ASSESSMENT — ACTIVITIES OF DAILY LIVING (ADL)
ADLS_ACUITY_SCORE: 35

## 2024-01-09 NOTE — DISCHARGE INSTRUCTIONS
Contacting your Doctor -   To contact a doctor, call Chris eMndez MD  [phone] and ask for the resident on call for ENT-Otolaryngology (answered 24 hours a day)   Emergency Department:  [facility]: [phone]  [facility]: [phone]  Dr Mendez stated okay for patient to not return to work until January 15th after follow-up appointment.   911 if you are in need of immediate or emergent help

## 2024-01-09 NOTE — PROGRESS NOTES
Head and Neck Surgery  1/9/2024    Met patient in preop and had discussion regarding pros and cons of hemithyroidectomy vs total thyroidectomy. We had previously discussed this in consultation and I spoke to his daughter about this on the phone after I met the patient.     He seemed still unsure of how he would like to proceed. I answered all of his and his son-in-laws questions.    He wishes to proceed with total thyroidectomy.     He understands the risks of hypoparathyroidism    Also understands that if I have concerns with the right RLN, I will not proceed with the left side today.    Chris Mendez MD

## 2024-01-09 NOTE — ANESTHESIA CARE TRANSFER NOTE
Patient: Brandan Alejandra    Procedure: Procedure(s):  total thyroidectomy and reimplantation of parathyroid       Diagnosis: Thyroid mass [E07.9]  Diagnosis Additional Information: No value filed.    Anesthesia Type:   General     Note:    Oropharynx: oropharynx clear of all foreign objects  Level of Consciousness: drowsy and awake  Oxygen Supplementation: nasal cannula  Level of Supplemental Oxygen (L/min / FiO2): 2  Independent Airway: airway patency satisfactory and stable  Dentition: dentition unchanged  Vital Signs Stable: post-procedure vital signs reviewed and stable  Report to RN Given: handoff report given  Patient transferred to: PACU    Handoff Report: Identifed the Patient, Identified the Reponsible Provider, Reviewed the pertinent medical history, Discussed the surgical course, Reviewed Intra-OP anesthesia mangement and issues during anesthesia, Set expectations for post-procedure period and Allowed opportunity for questions and acknowledgement of understanding      Vitals:  Vitals Value Taken Time   /106 01/09/24 1209   Temp     Pulse 92 01/09/24 1211   Resp     SpO2 98 % 01/09/24 1211   Vitals shown include unfiled device data.    Electronically Signed By: NIRANJAN Luis CRNA  January 9, 2024  12:12 PM

## 2024-01-09 NOTE — ANESTHESIA PREPROCEDURE EVALUATION
Anesthesia Pre-Procedure Evaluation    Patient: Brandan Alejandra   MRN: [id number] : 1960        Procedure : Procedure(s):  Right hemithyroidectomy, possible central neck dissection, possible total thyroidectomy          Past Medical History:   Diagnosis Date    HTN (hypertension)     Insomnia     Microalbuminuria     Prediabetes     Thalassemia     Thyroid mass       Past Surgical History:   Procedure Laterality Date    CHOLECYSTECTOMY, LAPOROSCOPIC  2020    INCISION AND DRAINAGE, ABSCESS, SIMPLE Right     RELEASE TRIGGER FINGER Right 12/15/2023    Procedure: RELEASE, RIGHT MIDDLE TRIGGER FINGER;  Surgeon: Umang Oneal MD;  Location: [facility] OR    REMOVE FOREIGN BODY FINGER Right 12/15/2023    Procedure: REMOVAL, FOREIGN BODY, RIGHT MIDDLE FINGER;  Surgeon: Umang Oneal MD;  Location: [facility] OR      No Known Allergies   Social History     Tobacco Use    Smoking status: Former     Types: Cigarettes     Quit date:      Years since quittin.0    Smokeless tobacco: Never   Substance Use Topics    Alcohol use: Not on file      Wt Readings from Last 1 Encounters:   24 79.2 kg (174 lb 9.7 oz)        Anesthesia Evaluation   Pt has had prior anesthetic.         ROS/MED HX  ENT/Pulmonary:  - neg pulmonary ROS     Neurologic:  - neg neurologic ROS     Cardiovascular:     (+)  hypertension- -   -  - -                                      METS/Exercise Tolerance:     Hematologic: Comments: Thalassemia       Musculoskeletal:       GI/Hepatic:  - neg GI/hepatic ROS     Renal/Genitourinary:  - neg Renal ROS     Endo:     (+)  type II DM,   Not using insulin,     thyroid problem,            Psychiatric/Substance Use:  - neg psychiatric ROS     Infectious Disease:  - neg infectious disease ROS     Malignancy:       Other:            Physical Exam    Airway        Mallampati: III       Respiratory Devices and Support         Dental       (+) Modest Abnormalities - crowns, retainers, 1 or 2 missing  "teeth      Cardiovascular             Pulmonary                   OUTSIDE LABS:  CBC:   Lab Results   Component Value Date    WBC 7.1 12/12/2023    HGB 13.5 12/12/2023    HCT 41.6 12/12/2023     12/12/2023     BMP: No results found for: \"NA\", \"POTASSIUM\", \"CHLORIDE\", \"CO2\", \"BUN\", \"CR\", \"GLC\"  COAGS: No results found for: \"PTT\", \"INR\", \"FIBR\"  POC: No results found for: \"BGM\", \"HCG\", \"HCGS\"  HEPATIC: No results found for: \"ALBUMIN\", \"PROTTOTAL\", \"ALT\", \"AST\", \"GGT\", \"ALKPHOS\", \"BILITOTAL\", \"BILIDIRECT\", \"SINDI\"  OTHER: No results found for: \"PH\", \"LACT\", \"A1C\", \"PAMELA\", \"PHOS\", \"MAG\", \"LIPASE\", \"AMYLASE\", \"TSH\", \"T4\", \"T3\", \"CRP\", \"SED\"    Anesthesia Plan    ASA Status:  2    NPO Status:  NPO Appropriate    Anesthesia Type: General.     - Airway: ETT   Induction: Intravenous.   Maintenance: Inhalation.   Techniques and Equipment:     - Airway: Video-Laryngoscope     - Lines/Monitors: BIS     Consents    Anesthesia Plan(s) and associated risks, benefits, and realistic alternatives discussed. Questions answered and patient/representative(s) expressed understanding.     - Discussed:     - Discussed with:  Patient            Postoperative Care    Pain management: IV analgesics, Oral pain medications.   PONV prophylaxis: Ondansetron (or other 5HT-3), Dexamethasone or Solumedrol     Comments:               Geovanna Stover MD    I have reviewed the pertinent notes and labs in the chart from the past 30 days and (re)examined the patient.  Any updates or changes from those notes are reflected in this note.              # Overweight: Estimated body mass index is 27.35 kg/m  as calculated from the following:    Height as of this encounter: 1.702 m (5' 7\").    Weight as of this encounter: 79.2 kg (174 lb 9.7 oz).      "

## 2024-01-09 NOTE — OP NOTE
Head and Neck Surgery  January 9, 2024      Surgeon: Chris Mendez MD     Assistant: Irma Simmons MD     Preoperative Diagnosis:   1) Indeterminate right thyroid nodule (Follicular neoplasm)     Postoperative Diagnosis:  Same     Procedure:  1) Total thyroidectomy  2) Reimplantation left inferior parathyroid     Anesthesia:  General     Estimated blood loss:  25 ml     Specimens:  Total thyroidectomy     Complications:  None     Operative indications:  63 year old male with incidental right thyroid nodule. 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 We discussed molecular testing vs hemithyroidectomy vs total thyroidectomy. He wished to proceed with total thyroidectomy. Consent as obtained.      Operative findings:  Total thyroidectomy performed. Bilateral RLNs preserved. Both stimulated at end of case. Left inferior parathyroid devascularized and placed into muscle pocket in left SCM.      Operative course:  Patient was brought to the operating room and placed on the operating table supine. General endotracheal anesthesia was induced with a nerve monitoring endotracheal tube. Incision was marked midway between the cricoid and sternal notch. 1% lidocaine with 1:100,000 epinephrine was used for local anesthesia. Timeout was performed to identify the patient and correctness of the procedure.  15 blade used to make incision through skin, subcutaneous tissue and platysma. Standard subplatysmal flaps were elevated superior and inferiorly. Midline raphe was opened. Right sternohyoid muscle elevated off of the sternothyroid. The sternothyroid was divided midway along its course and muscle bellies reflected superiorly and inferiorly to expose the thyroid lobe. The strap muscles elevated easily. The anterior wall of the trachea was then identified. The recurrent laryngeal nerve was then identified inferiorly. The inferior pole was then released staying on the thyroid capsule to reflect the inferior parathyroid off  of the lobe. Attention was then turned to the superior pole. Jolls spaced was opened and the superior pole was grasped with an allis clamp. The superior pole vessels were controlled with clips. Dissection continued laterally along the thyroid capsule reflecting the superior parathyroid gland off of the thyroid lobe. The recurrent laryngeal nerve was then traced into the cricothyroid joint. Berrys ligament was divided. The thyroid lobe was reflected off of the anterior wall of the trachea.    Similar procedure was then performed on the left and specimen sent permanent.     The left inferior parathyroid was devascularized. After confirming on frozen, it was reimplanted into a muscle pocket in the left SCM. It was marked with 3 small clips.    There was no evidence of central neck adenopathy. The wound was irrigated and hemostasis obtained. The midline raphe closed with 3-0 vicryl leaving a gap inferiorly, platysma with 3-0 vicryl, and skin with 4-0 monocryl.      Patient tolerated the procedure well. He was subsequently turned over to anesthesia where he was awakened, extubated, and transferred to the recovery room in stable condition.      Chris Mendez MD

## 2024-01-09 NOTE — PROVIDER NOTIFICATION
Dr. Roberto Valenzuela notified of post op labs and verbalzied okay for pt to discharge. Discontinue instructions updated.

## 2024-01-09 NOTE — ANESTHESIA PROCEDURE NOTES
Airway       Patient location during procedure: OR       Procedure Start/Stop Times: 1/9/2024 8:26 AM  Staff -        CRNA: Steven Romero APRN CRNA       Performed By: CRNA  Consent for Airway        Urgency: elective  Indications and Patient Condition       Indications for airway management: amy-procedural       Induction type:intravenous       Mask difficulty assessment: 1 - vent by mask    Final Airway Details       Final airway type: endotracheal airway       Successful airway: NIM  Endotracheal Airway Details        ETT size (mm): 7.0       Cuffed: yes       Successful intubation technique: video laryngoscopy       VL Blade Size: Glidescope 4       Grade View of Cords: 1       Adjucts: stylet       Position: Right       Measured from: gums/teeth       Secured at (cm): 24       Bite block used: None    Post intubation assessment        Placement verified by: capnometry, equal breath sounds and chest rise        Number of attempts at approach: 1       Number of other approaches attempted: 0       Secured with: tape       Ease of procedure: easy       Dentition: Intact and Unchanged    Medication(s) Administered   Medication Administration Time: 1/9/2024 8:26 AM

## 2024-01-09 NOTE — BRIEF OP NOTE
[facility]    Brief Operative Note    Pre-operative diagnosis: Thyroid mass [E07.9]  Post-operative diagnosis Same as pre-operative diagnosis    Procedure: total thyroidectomy and reimplantation of parathyroid, Bilateral - Neck    Surgeon: Surgeon(s) and Role:     * Chris Mendez MD - Primary     * Irma Simmons MD - Resident - Assisting     * Roberto Valenzuela MD - Resident - Assisting  Anesthesia: General   Estimated Blood Loss: 20 ml    Drains: None  Specimens:   ID Type Source Tests Collected by Time Destination   1 : Total Thyroidectomy, stitch left superior pole Tissue Thyroid SURGICAL PATHOLOGY EXAM Chris Mendez MD 1/9/2024 11:17 AM    2 : Evaluate for Left Inferior Parathyroid Tissue Other SURGICAL PATHOLOGY EXAM Chris Mendez MD 1/9/2024 11:18 AM      Findings:   Right thyroid nodule with reimplantation of left inferior parathyroid. Bilateral recurrent laryngeal nerves identified and stimulated at the end of the case .  Complications: None.  Implants: * No implants in log *        Roberto Valenzuela MD  ENT Resident

## 2024-01-09 NOTE — ANESTHESIA POSTPROCEDURE EVALUATION
Patient: Brandan Alejnadra    Procedure: Procedure(s):  total thyroidectomy and reimplantation of parathyroid       Anesthesia Type:  General    Note:  Disposition: Outpatient   Postop Pain Control: Uneventful            Sign Out: Well controlled pain   PONV: No   Neuro/Psych: Uneventful            Sign Out: Acceptable/Baseline neuro status   Airway/Respiratory: Uneventful            Sign Out: Acceptable/Baseline resp. status   CV/Hemodynamics: Uneventful            Sign Out: Acceptable CV status; No obvious hypovolemia; No obvious fluid overload   Other NRE: NONE   DID A NON-ROUTINE EVENT OCCUR? No           Last vitals:  Vitals Value Taken Time   /109 01/09/24 1315   Temp     Pulse 97 01/09/24 1327   Resp 18 01/09/24 1327   SpO2 99 % 01/09/24 1327   Vitals shown include unfiled device data.    Electronically Signed By: Lucas Flores MD  January 9, 2024  1:28 PM

## 2024-01-10 ENCOUNTER — OFFICE VISIT (OUTPATIENT)
Dept: INTERNAL MEDICINE | Facility: CLINIC | Age: 64
End: 2024-01-10
Payer: COMMERCIAL

## 2024-01-10 VITALS
RESPIRATION RATE: 17 BRPM | HEIGHT: 68 IN | BODY MASS INDEX: 26.64 KG/M2 | OXYGEN SATURATION: 95 % | WEIGHT: 175.8 LBS | DIASTOLIC BLOOD PRESSURE: 74 MMHG | SYSTOLIC BLOOD PRESSURE: 136 MMHG | HEART RATE: 91 BPM

## 2024-01-10 DIAGNOSIS — Z00.00 ROUTINE GENERAL MEDICAL EXAMINATION AT A HEALTH CARE FACILITY: Primary | ICD-10-CM

## 2024-01-10 DIAGNOSIS — R80.9 MICROALBUMINURIA: ICD-10-CM

## 2024-01-10 DIAGNOSIS — Z87.891 FORMER TOBACCO USE: ICD-10-CM

## 2024-01-10 DIAGNOSIS — R73.03 PREDIABETES: ICD-10-CM

## 2024-01-10 DIAGNOSIS — E78.5 HYPERLIPIDEMIA LDL GOAL <70: ICD-10-CM

## 2024-01-10 DIAGNOSIS — I10 ESSENTIAL HYPERTENSION: ICD-10-CM

## 2024-01-10 DIAGNOSIS — C73 THYROID CANCER (H): ICD-10-CM

## 2024-01-10 DIAGNOSIS — Z12.11 SCREEN FOR COLON CANCER: ICD-10-CM

## 2024-01-10 DIAGNOSIS — M65.322 TRIGGER FINGER, LEFT INDEX FINGER: ICD-10-CM

## 2024-01-10 DIAGNOSIS — Z12.5 SCREENING FOR PROSTATE CANCER: ICD-10-CM

## 2024-01-10 DIAGNOSIS — D56.9 THALASSEMIA, UNSPECIFIED TYPE: ICD-10-CM

## 2024-01-10 LAB — PSA SERPL DL<=0.01 NG/ML-MCNC: 0.89 NG/ML (ref 0–4.5)

## 2024-01-10 PROCEDURE — 99396 PREV VISIT EST AGE 40-64: CPT | Mod: 24 | Performed by: INTERNAL MEDICINE

## 2024-01-10 PROCEDURE — 99214 OFFICE O/P EST MOD 30 MIN: CPT | Mod: 25 | Performed by: INTERNAL MEDICINE

## 2024-01-10 ASSESSMENT — ENCOUNTER SYMPTOMS
HEARTBURN: 0
DIARRHEA: 0
SHORTNESS OF BREATH: 0
NAUSEA: 1
FREQUENCY: 0
WEAKNESS: 1
ABDOMINAL PAIN: 0
CONSTIPATION: 0
MYALGIAS: 0
CHILLS: 0
PALPITATIONS: 1
DIZZINESS: 1
HEADACHES: 0
ARTHRALGIAS: 1
DYSURIA: 0
PARESTHESIAS: 1
EYE PAIN: 0
COUGH: 0
JOINT SWELLING: 0
SORE THROAT: 1
FEVER: 0
NERVOUS/ANXIOUS: 1
HEMATURIA: 0
HEMATOCHEZIA: 0

## 2024-01-10 ASSESSMENT — PAIN SCALES - GENERAL: PAINLEVEL: NO PAIN (0)

## 2024-01-10 NOTE — ASSESSMENT & PLAN NOTE
Nodule found earlier this year. Biopsy 10/27/2023 showed follicular neoplasm with Hurthle cell predominance. S/p total thyroidectomy and reimplantation of parathyroid yesterday. Path pending.   -Continue levothyroxine 137 mcg daily  -Follow-up with ENT and endocrine as scheduled this month

## 2024-01-10 NOTE — PATIENT INSTRUCTIONS
You will get a call to schedule colonoscopy. Okay to do this in April/May.     Vaccines to get at pharmacy:   - Flu shot  - COVID vaccine booster (2023/2024 version)  - Shingles vaccines   - Can consider RSV    Preventive Health Recommendations  Male Ages 50 - 64    Yearly exam:             See your health care provider every year in order to  o   Review health changes.   o   Discuss preventive care.    o   Review your medicines if your doctor has prescribed any.   Have a cholesterol test every 5 years, or more frequently if you are at risk for high cholesterol/heart disease.   Have a diabetes test (fasting glucose) every three years. If you are at risk for diabetes, you should have this test more often.   Have a colonoscopy at age 45, or have a yearly FIT test (stool test). These exams will check for colon cancer.    Talk with your health care provider about whether or not a prostate cancer screening test (PSA) is right for you.  You should be tested each year for STDs (sexually transmitted diseases), if you re at risk.     Shots: Get a flu shot each year. Get a tetanus shot every 10 years.     Nutrition:  Eat at least 5 servings of fruits and vegetables daily.   Eat whole-grain bread, whole-wheat pasta and brown rice instead of white grains and rice.   Get adequate Calcium and Vitamin D.     Lifestyle  Exercise for at least 150 minutes a week (30 minutes a day, 5 days a week). This will help you control your weight and prevent disease.   Limit alcohol to one drink per day.   No smoking.   Wear sunscreen to prevent skin cancer.   See your dentist every six months for an exam and cleaning.   See your eye doctor every 1 to 2 years.

## 2024-01-10 NOTE — ASSESSMENT & PLAN NOTE
Quit last year. 2-3 cigarettes a day, pack would last week. Started at age 20.   - Continue annual lung cancer screening CTs

## 2024-01-10 NOTE — ASSESSMENT & PLAN NOTE
We discussed healthy lifestyle, nutrition, cardiovascular risk reduction, self care, safety, sunscreen, and timing of cancer screening.  Health maintenance screening and immunizations reviewed with the patient.    - Labs all updated within last year aside from PSA, added on to labs from yesterday  - Due for colonoscopy ordered today   - Recommended COVID, flu and shingles vaccines -they are going to wait until he is more recovered from his surgery and get at the pharmacy  -CT lung cancer screening 2S 9/18/23

## 2024-01-10 NOTE — ASSESSMENT & PLAN NOTE
No electrophoresis for review.  They tell me family members also have thalassemia.  Has had persistent microcytosis on previous CBCs.

## 2024-01-10 NOTE — PROGRESS NOTES
SUBJECTIVE:   Brandan is a 63 year old, presenting for the following:  Physical and Establish Care        1/10/2024    11:09 AM   Additional Questions   Roomed by Cristhian MCCAIN CMA     Healthy Habits:     Getting at least 3 servings of Calcium per day:  Yes    Bi-annual eye exam:  Yes    Dental care twice a year:  Yes    Sleep apnea or symptoms of sleep apnea:  Daytime drowsiness    Diet:  Low fat/cholesterol and Diabetic    Frequency of exercise:  1 day/week    Duration of exercise:  N/A    Taking medications regularly:  Yes    Medication side effects:  Other    Additional concerns today:  No    Today's PHQ-2 Score:       1/10/2024    11:03 AM   PHQ-2 ( 1999 [employer])   Q1: Little interest or pleasure in doing things 1   Q2: Feeling down, depressed or hopeless 1   PHQ-2 Score 2   Q1: Little interest or pleasure in doing things Several days   Q2: Feeling down, depressed or hopeless Several days   PHQ-2 Score 2       Brandan is here with his son to establish care and for physical. Moved to [place] in November from [place].  We reviewed recent medical history as below.  He is doing fairly well today, although has had quite a busy last few months.  Had surgery yesterday for his thyroid cancer.    Thyroid cancer: Nodule found earlier this year. Biopsy 10/27/2023 showed follicular neoplasm with Hurthle cell predominance. S/p total thyroidectomy and reimplantation of parathyroid yesterday. Path pending.   - ENT f/up 1/15/24 and endo appt scheduled 1/23/24  - Did get levothyroxine 137 mcg daily, is able to verbalize to me today the importance of taking this in the morning on an empty stomach separate from other medications    HTN: Losartan 25 mg daily. Not checking BP at home.  Blood pressure today 136/74.    HLD: Atorvastatin 40 mg daily. Lipids Tchol 125, , HDL 41, LDL 52 (9/2023)    Pre-DM: Metformin 500 mg daily.   - A1c 6.3 (9/2023)  - Of note has thalassemia, so A1c may not be accurate/underestimating\  - Did have  microalbuminuria 2023     Trigger finger: Had release of right hand trigger finger 12/15/2023.  Has this on the left as well, they are planning additional surgery in March she tells me.  Following at [facility].    Prior smoker. Quit last year. 2-3 cigarettes a day, pack would last week. Started at age 20.   - CT lung cancer screening 2S 23    Has never had any colon cancer screening.  They tell me today that his daughter did have polyps on his screening colonoscopy in her 30s.    Have you ever done Advance Care Planning? (For example, a Health Directive, POLST, or a discussion with a medical provider or your loved ones about your wishes): No, advance care planning information given to patient to review.  Patient plans to discuss their wishes with loved ones or provider.      Social History     Tobacco Use     Smoking status: Former     Types: Cigarettes     Quit date:      Years since quittin.0     Smokeless tobacco: Never   Substance Use Topics     Alcohol use: Not on file         1/10/2024    11:02 AM   Alcohol Use   Prescreen: >3 drinks/day or >7 drinks/week? Not Applicable       Last PSA: 0.91 (22)    Reviewed orders with patient. Reviewed health maintenance and updated orders accordingly - Yes  Lab work is in process  Labs reviewed in EPIC    Reviewed and updated as needed this visit by clinical staff   Tobacco  Allergies  Meds  Problems  Med Hx  Surg Hx  Fam Hx          Reviewed and updated as needed this visit by Provider   Tobacco  Allergies  Meds  Problems  Med Hx  Surg Hx  Fam Hx         Past Medical History:   Diagnosis Date     HTN (hypertension)      Insomnia      Microalbuminuria      Prediabetes      Thalassemia      Thyroid mass       Past Surgical History:   Procedure Laterality Date     CHOLECYSTECTOMY, LAPOROSCOPIC  2020     INCISION AND DRAINAGE, ABSCESS, SIMPLE Right      RELEASE TRIGGER FINGER Right 12/15/2023    Procedure: RELEASE, RIGHT MIDDLE TRIGGER FINGER;  " Surgeon: Umang Oneal MD;  Location: [facility] OR     REMOVE FOREIGN BODY FINGER Right 12/15/2023    Procedure: REMOVAL, FOREIGN BODY, RIGHT MIDDLE FINGER;  Surgeon: Umang Oneal MD;  Location: [facility] OR     THYROIDECTOMY Bilateral 1/9/2024    Procedure: total thyroidectomy and reimplantation of parathyroid;  Surgeon: Chris Mendez MD;  Location: [facility] OR       Review of Systems   Constitutional:  Negative for chills and fever.   HENT:  Positive for ear pain and sore throat. Negative for congestion and hearing loss.    Eyes:  Negative for pain and visual disturbance.   Respiratory:  Negative for cough and shortness of breath.    Cardiovascular:  Positive for palpitations and peripheral edema. Negative for chest pain.   Gastrointestinal:  Positive for nausea. Negative for abdominal pain, constipation, diarrhea, heartburn and hematochezia.   Genitourinary:  Negative for dysuria, frequency, genital sores, hematuria, impotence, penile discharge and urgency.   Musculoskeletal:  Positive for arthralgias. Negative for joint swelling and myalgias.   Skin:  Positive for rash.   Neurological:  Positive for dizziness, weakness and paresthesias. Negative for headaches.   Psychiatric/Behavioral:  Negative for mood changes. The patient is nervous/anxious.        OBJECTIVE:   /74 (BP Location: Right arm, Patient Position: Sitting, Cuff Size: Adult Regular)   Pulse 91   Resp 17   Ht 1.715 m (5' 7.5\")   Wt 79.7 kg (175 lb 12.8 oz)   SpO2 95%   BMI 27.13 kg/m      Physical Exam  GENERAL: healthy, alert and no distress  EYES: Eyes grossly normal to inspection, PERRL and conjunctivae and sclerae normal  HENT: ear canals and TM's normal, nose and mouth without ulcers or lesions  NECK: Surgical scare is C/D/I, some edema surrounding incision site   RESP: lungs clear to auscultation - no rales, rhonchi or wheezes  CV: regular rate and rhythm, normal S1 S2, no S3 or S4, no murmur, click or rub, no peripheral edema and peripheral " pulses strong  ABDOMEN: soft, nontender, no hepatosplenomegaly, no masses and bowel sounds normal  MS: no gross musculoskeletal defects noted, no edema  SKIN: no suspicious lesions or rashes  NEURO: Normal strength and tone, mentation intact and speech normal  PSYCH: mentation appears normal, affect normal/bright    Diagnostic Test Results:  Labs reviewed in Epic    ASSESSMENT/PLAN:     Problem List Items Addressed This Visit          Endocrine    Hyperlipidemia LDL goal <70     Lipids Tchol 125, , HDL 41, LDL 52 (9/2023).  Well-controlled on current regimen.  -Continue atorvastatin 40 mg daily         Prediabetes     A1c 6.3 9/2023.  Of note due to thalassemia at this may be an underestimate of his average blood sugars.  -Has endocrine follow-up scheduled, may benefit from a diagnostic CGM  -For now, continue metformin 500 mg daily         Thyroid cancer (H)     Nodule found earlier this year. Biopsy 10/27/2023 showed follicular neoplasm with Hurthle cell predominance. S/p total thyroidectomy and reimplantation of parathyroid yesterday. Path pending.   -Continue levothyroxine 137 mcg daily  -Follow-up with ENT and endocrine as scheduled this month            Circulatory    Essential hypertension     Well-controlled on current regimen.  - Continue losartan 25 mg daily             Musculoskeletal and Integumentary    Trigger finger, left index finger     Following with orthopedics.  He tells me they are planning a release for this hand sometime in early 2024, possibly March.            Hematologic    Thalassemia     No electrophoresis for review.  They tell me family members also have thalassemia.  Has had persistent microcytosis on previous CBCs.            Behavioral    Former tobacco use     Quit last year. 2-3 cigarettes a day, pack would last week. Started at age 20.   - Continue annual lung cancer screening CTs            Other    Microalbuminuria     Slight on labs 9/2023.  -Continue losartan 25 mg  "daily         Routine general medical examination at a health care facility - Primary     We discussed healthy lifestyle, nutrition, cardiovascular risk reduction, self care, safety, sunscreen, and timing of cancer screening.  Health maintenance screening and immunizations reviewed with the patient.    - Labs all updated within last year aside from PSA, added on to labs from yesterday  - Due for colonoscopy ordered today   - Recommended COVID, flu and shingles vaccines -they are going to wait until he is more recovered from his surgery and get at the pharmacy  -CT lung cancer screening 2S 9/18/23          Other Visit Diagnoses       Screen for colon cancer        Relevant Orders    Colonoscopy Screening  Referral    Screening for prostate cancer        Relevant Orders    PSA, screen            Patient has been advised of split billing requirements and indicates understanding: Yes    COUNSELING:   Reviewed preventive health counseling, as reflected in patient instructions  Special attention given to:        Regular exercise       Healthy diet/nutrition       Immunizations     BMI:   Estimated body mass index is 27.13 kg/m  as calculated from the following:    Height as of this encounter: 1.715 m (5' 7.5\").    Weight as of this encounter: 79.7 kg (175 lb 12.8 oz).   Weight management plan: Discussed healthy diet and exercise guidelines      He reports that he quit smoking about 2 years ago. His smoking use included cigarettes. He has never used smokeless tobacco.            Kandice Noyola MD  [facility]  "

## 2024-01-10 NOTE — ASSESSMENT & PLAN NOTE
A1c 6.3 9/2023.  Of note due to thalassemia at this may be an underestimate of his average blood sugars.  -Has endocrine follow-up scheduled, may benefit from a diagnostic CGM  -For now, continue metformin 500 mg daily

## 2024-01-10 NOTE — ASSESSMENT & PLAN NOTE
Lipids Tchol 125, , HDL 41, LDL 52 (9/2023).  Well-controlled on current regimen.  -Continue atorvastatin 40 mg daily

## 2024-01-10 NOTE — ASSESSMENT & PLAN NOTE
Following with orthopedics.  He tells me they are planning a release for this hand sometime in early 2024, possibly March.

## 2024-01-11 ENCOUNTER — PATIENT OUTREACH (OUTPATIENT)
Dept: OTOLARYNGOLOGY | Facility: CLINIC | Age: 64
End: 2024-01-11
Payer: COMMERCIAL

## 2024-01-11 NOTE — PROGRESS NOTES
Called and spoke to patient's daughter regarding how patient is doing since surgery with Dr. Mendez on 1/9/24. Per daughter patient's pain is well controlled and incision has some slight redness without drainage or swelling. Daughter sending MyChart message with photo of incision to further evaluate. Educated daughter on other signs of infection. Patient scheduled for post op follow up appointment with Dr. Mendez on 1/15/24 and had no further questions or concerns at this time.     Jessica Rey, RN, BSN  RN Care Coordinator, ENT Clinic

## 2024-01-15 ENCOUNTER — OFFICE VISIT (OUTPATIENT)
Dept: OTOLARYNGOLOGY | Facility: CLINIC | Age: 64
End: 2024-01-15
Payer: COMMERCIAL

## 2024-01-15 VITALS
HEIGHT: 67 IN | WEIGHT: 170.2 LBS | BODY MASS INDEX: 26.71 KG/M2 | DIASTOLIC BLOOD PRESSURE: 104 MMHG | HEART RATE: 102 BPM | OXYGEN SATURATION: 96 % | SYSTOLIC BLOOD PRESSURE: 177 MMHG

## 2024-01-15 DIAGNOSIS — E07.9 THYROID MASS: Primary | ICD-10-CM

## 2024-01-15 PROCEDURE — 99024 POSTOP FOLLOW-UP VISIT: CPT | Performed by: STUDENT IN AN ORGANIZED HEALTH CARE EDUCATION/TRAINING PROGRAM

## 2024-01-15 NOTE — LETTER
January 15, 2024      Brandan Alejandra  [address] [place] [address]        To Whom It May Concern:    Brandan Alejandra was seen in our clinic today for follow up. He may return to work without restrictions on 1/29/24.       Sincerely,        Chris Mendez MD

## 2024-01-15 NOTE — LETTER
1/15/2024       RE: Brandan Alejandra  [address]  [place] [address]     Dear Colleague,    Thank you for referring your patient, Brandan Alejandra, to the [facility] [place] [facility]. Please see a copy of my visit note below.    Head and Neck Surgery  1/15/2024    Diagnosis: Indeterminate right thyroid nodule    Treatment:  Total thyroidectomy 1/9/24    Pathology:  Pending     Interval history:  No complaints today. There were no intraoperative or postoperative complications.  He was discharged on 2500 mg calcium carbonate 3 times daily. No hypocalcemia symptoms. He has some persistent pain.    Physical examination:  Well-healed cervical incision.  Fullness in neck consistent with a seroma  No signs of infection  Voice strong    Procedure:  Fiberoptic endoscopy performed show normal vocal cord mobility    Ultrasound used to examine the neck.  There is evidence of a seroma.  This was aspirated with 18-gauge needle showing old blood products.    Assessment and plan:  He was well today  We will keep him updated on pathology results  We discussed manage wound cares  He has an appt scheduled with Dr. Sarabia on 1/23.     Chris Mendez MD    25 minutes spent on the date of the encounter in chart review, patient visit, review of tests, documentation and/or discussion with other providers about the issues documented above aside from time spent doing flexible laryngoscopy

## 2024-01-15 NOTE — PATIENT INSTRUCTIONS
You were seen in the ENT Clinic today by Dr. Mendez. If you have any questions or concerns after your appointment, please contact us (see below)    The following has been recommended for you based upon your appointment today:  We will call you when your pathology results are back from surgery     Please return to clinic as needed    How to Contact Us:  Send a Grows Up message to your provider. Our team will respond to you via Grows Up. Occasionally, we will need to call you to get further information.  For urgent matters (Monday-Friday), call the ENT Clinic: [phone] and speak with a call center team member - they will route your call appropriately.   If you'd like to speak directly with a nurse, please find our contact information below. We do our best to check voicemail frequently throughout the day, and will work to call you back within 1-2 days. For urgent matters, please use the general clinic phone numbers listed above.    Jessica MAYFIELD RN, BSN  RN Care Coordinator, ENT Clinic  [facility]  Direct: [phone]

## 2024-01-15 NOTE — NURSING NOTE
"Chief Complaint   Patient presents with    RECHECK   Blood pressure (!) 177/104, pulse 102, height 1.702 m (5' 7\"), weight 77.2 kg (170 lb 3.2 oz), SpO2 96%. Milton Atnhony, EMT    "

## 2024-01-16 ENCOUNTER — MYC MEDICAL ADVICE (OUTPATIENT)
Dept: OTOLARYNGOLOGY | Facility: CLINIC | Age: 64
End: 2024-01-16
Payer: COMMERCIAL

## 2024-01-16 NOTE — PROGRESS NOTES
Head and Neck Surgery  1/15/2024    Diagnosis: Indeterminate right thyroid nodule    Treatment:  Total thyroidectomy 1/9/24    Pathology:  Pending     Interval history:  No complaints today. There were no intraoperative or postoperative complications.  He was discharged on 2500 mg calcium carbonate 3 times daily. No hypocalcemia symptoms. He has some persistent pain.    Physical examination:  Well-healed cervical incision.  Fullness in neck consistent with a seroma  No signs of infection  Voice strong    Procedure:  Fiberoptic endoscopy performed show normal vocal cord mobility    Ultrasound used to examine the neck.  There is evidence of a seroma.  This was aspirated with 18-gauge needle showing old blood products.    Assessment and plan:  He was well today  We will keep him updated on pathology results  We discussed manage wound cares  He has an appt scheduled with Dr. Sarabia on 1/23.     Chris Mendez MD    25 minutes spent on the date of the encounter in chart review, patient visit, review of tests, documentation and/or discussion with other providers about the issues documented above aside from time spent doing flexible laryngoscopy

## 2024-01-18 LAB
PATH REPORT.COMMENTS IMP SPEC: NORMAL
PATH REPORT.COMMENTS IMP SPEC: NORMAL
PATH REPORT.FINAL DX SPEC: NORMAL
PATH REPORT.GROSS SPEC: NORMAL
PATH REPORT.INTRAOP OBS SPEC DOC: NORMAL
PATH REPORT.MICROSCOPIC SPEC OTHER STN: NORMAL
PATH REPORT.RELEVANT HX SPEC: NORMAL
PHOTO IMAGE: NORMAL

## 2024-01-23 ENCOUNTER — VIRTUAL VISIT (OUTPATIENT)
Dept: ENDOCRINOLOGY | Facility: CLINIC | Age: 64
End: 2024-01-23
Payer: COMMERCIAL

## 2024-01-23 ENCOUNTER — PRE VISIT (OUTPATIENT)
Dept: ENDOCRINOLOGY | Facility: CLINIC | Age: 64
End: 2024-01-23

## 2024-01-23 DIAGNOSIS — R73.03 PREDIABETES: ICD-10-CM

## 2024-01-23 DIAGNOSIS — R79.89 LOW SERUM PARATHYROID HORMONE (PTH): ICD-10-CM

## 2024-01-23 DIAGNOSIS — E89.0 POSTOPERATIVE HYPOTHYROIDISM: Primary | ICD-10-CM

## 2024-01-23 PROBLEM — C73 MALIGNANT NEOPLASM OF THYROID GLAND (H): Status: RESOLVED | Noted: 2023-09-25 | Resolved: 2024-01-23

## 2024-01-23 PROCEDURE — 99205 OFFICE O/P NEW HI 60 MIN: CPT | Mod: GC

## 2024-01-23 RX ORDER — CALCIUM CARBONATE 500 MG/1
3 TABLET, CHEWABLE ORAL 3 TIMES DAILY
Qty: 300 TABLET | Refills: 0 | Status: SHIPPED | OUTPATIENT
Start: 2024-01-23

## 2024-01-23 RX ORDER — LEVOTHYROXINE SODIUM 137 UG/1
137 TABLET ORAL DAILY
Qty: 90 TABLET | Refills: 4 | Status: SHIPPED | OUTPATIENT
Start: 2024-01-23 | End: 2024-07-10

## 2024-01-23 ASSESSMENT — PAIN SCALES - GENERAL: PAINLEVEL: SEVERE PAIN (6)

## 2024-01-23 NOTE — NURSING NOTE
Is the patient currently in the state of [place]? YES    Visit mode:VIDEO    If the visit is dropped, the patient can be reconnected by: VIDEO VISIT: Text to cell phone:   Telephone Information:   Mobile [phone]       Will anyone else be joining the visit? NO  (If patient encounters technical issues they should call [phone]    How would you like to obtain your AVS? MyChart    Are changes needed to the allergy or medication list? No    Reason for visit: Consult    Shelby Kocher VVF

## 2024-01-23 NOTE — PROGRESS NOTES
Endocrine Consult Video visit note-     Attending Assessment/Plan :     Post surgical hypothyroidism- unstable.  On LT4 137 mcg/day (1.77 mcg/kg/day).   Treat to normal target TSH.  We will get him stabilized and then send him back to his PCP for long term management of this .  Labs in one month : TSH, free T4 .     Addendum:  7/10/24 TSH 92.1 (this was supposed to be drawn in February 2024 and the results strongly suggests he is not taking the LT4 at all ); I see LT4 was re ordered 7/10/24 despite the original Rx being for 90 tablet with 4 refills on 1/23/234.   HgbA1c 5.9, Ca 9.3, phos 3.5, PTH 32  creatinine 1.17, K 6.3  8/12/24 TSH 2.64, free T4 2.03     S/p removal of Hurthle cell adenoma.    Low PTH noted post op.  He is not currently having low calcium symptoms   Reduce calcium now from 5 tablets tid to 3 tablets tid.     Prediabetes/ vs Diabetes mellitus type 2 with increased UAE on metformin . This is followed by his PCP Dr Noyola. He might benefit from medication management appt.  He had many questions about how to take his medication today.      Communication was difficult  for this appt.  He speaks English but I wasn't sure he was understanding me consistently.     Due to the COVID 19 pandemic this visit was a video visit in order to help prevent spread of infection in this patient and the general population. The patient gave verbal consent for the visit today. I have independently reviewed and interpreted labs, imaging as indicated.    Distant Location (provider location):  Off-site  Mode of Communication:  Video Conference via BFKW  Chart review/prep time 1  [id number]  Visit Start time  1800  Visit Stop time 1825   _67_ minutes spent on the date of the encounter doing chart review, history and exam, documentation and further activities as noted above.    Elana Sarabia MD    Chief complaint:  Brandan is a 63 year old male seen in consultation at the request of Dr Chris Mendez for thyroid  [facility]   I have reviewed Care Everywhere including [facility], [place] lab reports, imaging reports and provider notes as indicated.      HISTORY OF PRESENT ILLNESS  Brandan has been under the care of endocrinologist Dr Kamron Ochoa in the [facility] system.  However, he moved to [facility] since then, so he now prefers care closer to home .     He first presented to [facility] on 9/8/23 with concern for hand swelling.  Right neck supraclavicular sternoclavicular  juntion mass was noted on examination.  US showed right thyroid nodule 4.5 cm, mixed.  FNAB 9/25/23 was suspicious for neoplasm .   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 He is on metfomrin.  He doesn't have a BS meter.  He doesn't check his BS.      Endocrine relevant labs are as follows:  9/13/21 HgbA1c 6.1%, UAE 34  9/8/23 HgbA1c 6.3%, TSH 1.67  10/10/23 calcitonin < 5  10/18/23 TSH 0.33, free T4 1.14 ng/dL, calcitonin < 5 , PTH 21.1, aldosterone 9.9, renin activity < 0.6, prolactin 3.2  LH 4.5, FSH 2.1,  testosterone 250 ng/dL, normetanephrine 0.33 nM, metanephrine < 0.2, UAE 35  1/9/24 Ca 8.4, PTH 11    Relevant imaging is as follows: (as read by me as it pertains to endocrine relevant organs)  9/12/23 thyroid US   Right inferior mixed nodule 4.5 x 3.4 x 4.3   9/25/23 FNAB suspicious for neoplasm- with differential oncocytic follicular neoplasm and MTC (as read by [place]). As read at [facility] : follicular neoplasm with hurthle cell predominance  Left anterior border 0.7 x 0.5 x 0.8 isoechoic   9/15/23 CT chest with contrast :  large right mixed density thyroid mass   9/27/23 MR chest   10/23/23 left axillary mass  3 x 3.1 x 2.9 hypoechoic  FNAB   10/30/23     REVIEW OF SYSTEMS  Feel pretty good  Lost appetite; eating softer  foods since surgery;   Weight loss 6# since the surgery   Bedtime 7-8 PM; up at 3 -4 AM;   Surgical site is healing - still has a little bump; no pain -   Voice is lower volume - can't yell  No choking on water  Eyes: no known retinopathy - last saw eye doctor 2  years   Cardiac: negative  Respiratory: negative; might cough with reclining  at the dentist last week -   GI: diarrhea sometimes - ; BM x up to 2 times/day, including 3-4 AM; back to bed until 8 and then go again  after coffee   No paresthesias  No cramps   Worried medication makes his drowsy - metformin, high cholesterol  10 system ROS otherwise as per the HPI or negative    Past Medical History  Past Medical History:   Diagnosis Date    Headache     HTN (hypertension)     Insomnia     Microalbuminuria     Postoperative hypothyroidism 01/09/2024    Prediabetes 2021    Thalassemia     Thyroid mass 09/2023    Hurthle cell adenoma on surgical path     Past Surgical History:   Procedure Laterality Date    CHOLECYSTECTOMY, LAPOROSCOPIC  07/16/2020    INCISION AND DRAINAGE, ABSCESS, SIMPLE Right     RELEASE TRIGGER FINGER Right 12/15/2023    Procedure: RELEASE, RIGHT MIDDLE TRIGGER FINGER;  Surgeon: Umang Oneal MD;  Location: [facility] OR    REMOVE FOREIGN BODY FINGER Right 12/15/2023    Procedure: REMOVAL, FOREIGN BODY, RIGHT MIDDLE FINGER;  Surgeon: Umang Oneal MD;  Location: [facility] OR    THYROIDECTOMY Bilateral 1/9/2024    Procedure: total thyroidectomy and reimplantation of parathyroid;  Surgeon: Chris Mendez MD;  Location: [facility] OR     Medications  Current Outpatient Medications   Medication Sig Dispense Refill    acetaminophen (TYLENOL) 325 MG tablet Take 2 tablets (650 mg) by mouth every 4 hours as needed for mild pain 50 tablet 0    atorvastatin (LIPITOR) 40 MG tablet Take 1 tablet by mouth every evening      calcium carbonate (TUMS) 500 MG chewable tablet Take 3 tablets (1,500 mg) by mouth 3 times daily 300 tablet 0    levothyroxine  "(SYNTHROID/LEVOTHROID) 137 MCG tablet Take 1 tablet (137 mcg) by mouth daily 90 tablet 4    losartan (COZAAR) 25 MG tablet Take 1 tablet by mouth daily      metFORMIN (GLUCOPHAGE) 500 MG tablet       ondansetron (ZOFRAN ODT) 4 MG ODT tab Take 1 tablet (4 mg) by mouth every 8 hours as needed for nausea 4 tablet 0    oxyCODONE (ROXICODONE) 5 MG tablet Take 1-2 tablets (5-10 mg) by mouth every 4 hours as needed for moderate to severe pain 6 tablet 0    senna-docusate (SENOKOT-S/PERICOLACE) 8.6-50 MG tablet Take 1-2 tablets by mouth 2 times daily 30 tablet 0     Calcium is currently 5 tablets three times/day  Metformin is 2 tablets once/day at HS     Allergies  No Known Allergies    Family History  Family History   Problem Relation Age of Onset    Cancer Mother     Verruca vulgaris Father     Thyroid Disease Daughter         thyroid surgery    Thyroid Cancer No family hx of        Social History  Social History     Tobacco Use    Smoking status: Former     Types: Cigarettes     Quit date:      Years since quittin.0    Smokeless tobacco: Never   Vaping Use    Vaping Use: Never used     Born in [facility]; [place] to [place] in [employer]  Moved to 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  BP Readings from Last 1 Encounters:   01/15/24 (!) 177/104      Pulse Readings from Last 1 Encounters:   01/15/24 102      Resp Readings from Last 1 Encounters:   01/10/24 17      Temp Readings from Last 1 Encounters:   24 97.6  F (36.4  C) (Oral)      SpO2 Readings from Last 1 Encounters:   01/15/24 96%      Wt Readings from Last 1 Encounters:   01/15/24 77.2 kg (170 lb 3.2 oz)      Ht Readings from Last 1 Encounters:   01/15/24 1.702 m (5' 7\")     GENERAL: no distress; I can see from upper chest " up ; voice normal   SKIN: Visible skin clear.   EYES: glasses; Eyes grossly normal to inspection.    NECK: visible goiter is not present; no visible neck masses; surgical site easily visible- low transverse scar.  No redness.    RESP: No audible wheeze, cough, or increased work of breathing.    NEURO: Awake, alert, responds appropriately to questions.  Mentation and speech fluent.  PSYCH:affect normal and appearance well-groomed.    DATA REVIEW    Pathology Consult: [id number]  Order: [id number]  Collected 10/27/2023  2:32 PM    Status: Final result    Visible to patient: Yes (seen)    0 Result Notes      Component    Case Report   Consult Report                                    Case: [id number]                                   Authorizing Provider:  Chris Mendez MD  Collected:           10/27/2023 02:32 PM           Ordering Location:     [facility]     Received:            10/27/2023 02:32 PM                                  [facility]                                                       Pathologist:           Jose Mendieta III, MD                                                       Specimen:    Slide(s), [id number]                                                                      Final Diagnosis   CASE FROM [place], [place] ([id number], OBTAINED 09/25/2023):  THYROID RIGHT, RIGHT THYROID NODULE, FINE NEEDLE ASPIRATION  - Follicular neoplasm with Hurthle cell predominance (Thyroid [place] Category IV) - see comment    Electronically signed by Jose Mendieta III, MD on 10/30/2023 at  9:48 AM   Comment    We concur with the referring pathologist's interpretation and appreciate the opportunity to consult on this specimen. Cytological preparations show clusters and sheets of follicular epithelial cells with oncocytic morphology, supporting the above diagnosis.   THE [place] IMPLIED RISK OF MALIGNANCY AND RECOMMENDED CLINICAL MANAGEMENT:  Follicular  neoplasm has a 25-40% risk of malignancy, recommended management is surgical lobectomy or molecular testing.   Original Case ID    [id number]   Material Submitted    10 slides   Clinical Information    The patient is a 63-year-old female with a complex nontoxic single thyroid nodule    Gross Description    Received from [facility] in [place], [place] are 10 stained slides labeled [id number] (obtained 09/25/2023), and copies of the referring pathologist's reports with patient identifying information.  All slides are returned.    Microscopic Description    A microscopic examination was performed.   Case was reviewed by the following:  Pathology Fellow: Aneudy Bean MD  A resident or fellow in a training program was involved in the initial review, preparation, and/or interpretation of this case.  I, as the senior physician, attest that I have personally reviewed all specimens and or slides, including the listed special stains, and used them with my medical judgement to determine the final diagnosis.   Performing Labs    The technical component of this testing was completed at [facility]   Resulting Agency [facility]              Specimen Collected: 10/27/23  2:32 PM Last Resulted: 10/30/23  9:48 AM                 Surgical Pathology Exam: [id number]  Order: [id number]  Collected 1/9/2024 11:17 AM    Status: Final result    Visible to patient: Yes (not seen)    0 Result Notes   1 Patient Communication        Component  Resulting Agency    Case Report    Surgical Pathology Report                         Case: [id number]                                    Authorizing Provider:  Chris Mendez MD  Collected:           01/09/2024 11:18 AM            Ordering Location:      MAIN OR                 Received:            01/09/2024 11:24 AM            Pathologist:           Kalina Mae MD                                           "          Intraop:               Praveena Piña MD                                                              Specimens:   A) - Other, Evaluate for Left Inferior Parathyroid                                                   B) - Thyroid, Total Thyroidectomy, stitch left superior pole                                Final Diagnosis    A.  PARATHYROID GLAND, LEFT INFERIOR,  BIOPSY:  -Parathyroid gland tissue with no histopathologic abnormality.  B.  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  -The neoplasm is located within the lower right thyroid lobe and isthmus, and is encapsulated, with no extension into surrounding thyroid gland.  -Margins are negative.  -Biopsy site changes.  -Two benign colloid nodules present within left lobe, 0.9 cm and 0.4 cm in linear extent.  -Background thyroid parenchyma with no other histopathologic abnormality.  -One benign perithyroidal lymph node.  -No evidence of malignancy identified.    Electronically signed by Kalina Mae MD on 1/18/2024 at  4:27 PM    Clinical Information  [facility]    63 year old male with incidental right thyroid nodule. 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 Molecular testing vs hemithyroidectomy vs total thyroidectomy were discussed with the patient. He wished to proceed with total thyroidectomy.    Intraoperative Consultation  REY TUTTLE(2). Other, Evaluate for Left Inferior Parathyroid:  AFS1:  - Parathyroid gland      Praveena Piña MD on 1/9/2024 at 11:37 AM  Intra op performed at: Lab  Intra-op Dx verbally delivered to Dr Andrea TUTTLE(2). Other, Evaluate for Left Inferior Parathyroid:  The specimen is received fresh for frozen section, with proper patient identification, labeled \"evaluate for left inferior parathyroid\".  It consists of a a less than 0.1 g, 0.2 cm in greatest dimension pink-red soft tissue.  The specimen is submitted entirely for frozen section. " " The remainder of the frozen section block is submitted as A1 FS.  B(1). Thyroid, Total Thyroidectomy, stitch left superior pole:  The specimen is received fresh with proper patient identification labeled \"total thyroidectomy, stitch left superior pole\".  The specimen consists of 50.98 g, 6.2 cm transversely by 7.2 cm superior to inferior by 3.3 cm anterior to posterior total thyroidectomy, which is oriented per the requisition as stitch left superior pole. The right thyroid lobe is 7.2 cm superior to inferior by 2.8 cm transversely by 3.3 cm anterior to posterior, the isthmus is 3.4 cm superior to inferior by 1.8 cm transversely by 1.2 cm anterior to posterior, and the left thyroid lobe is 4.7 cm superior to inferior by 2.0 cm transversely by 2.3 cm anterior to posterior.  There is a pyramidal lobe located close to the right lobe measuring 2.1 cm from superior to inferior by 0.7 cm transverse by 0.1 cm from anterior to posterior.  There is a gray-tan possible lymph node attached to the isthmus superiorly measuring 0.5 x 0.4 x 0.3 cm. The thyroid capsule is red-brown and intact.   The specimen is inked as follows: Anterior right lobe-blue, anterior isthmus-orange, anterior left lobe-green, and entire posterior aspect-black.  The specimen is serially sectioned from superior to inferior.  Sectioning the right thyroid lobe and isthmus reveals a 3.8 x 3.1 x 2.4 cm brown-tan, soft, hemorrhagic, necrotic capsulated mass, which is located within the mid to inferior pole.  The mass consists of multiple cavities of different sizes.  The mass is 0.1 cm from the anterior surface of the isthmus and abutting the anterior surface of the right lobe, and abutting posterior surface surfaces of the right lobe and isthmus.  The mass is encapsulated however there is an area of potential capsular disruption identified grossly. Gross extension through the thyroid capsule is not identified.  Sectioning the left thyroid lobe reveals a 0.6 " x 0.5 x 0.9 cm well-circumscribed encapsulated mass, filled with gelatinous transparent material, the mass is located in the superior part of the upper lobe.  The mass is abutting the anterior surface of the left lobe and is 0.5 cm from the posterior surface of the left lobe and 1.1 cm from the isthmus. The remainder of the cut surfaces are red-brown and glistening with no additional masses or lesions.  Representative tissue submitted:  Summary of sections  B1-B2: Representative sections of normal thyroid tissue, superior right lobe  B3: Mass with anterior and posterior surfaces  B4-B5: Section of the mass in the right lobe with anterior and posterior surfaces  B6-B15: Entire capsule of the mass from the lobe  B16-B22: Entire lesion with the capsule from the isthmus  B23-B26: Entire capsulated lesion from left lobe  B27: Representative section of normal thyroid tissue from left lobe    Microscopic Description  [facility]    Microscopic examination has been performed. BRAF V600E immunohistochemical stain with appropriate control was performed on block B17 and the tumor cells are negative.  Representative blocks are B12, B13, B15, and B17.  I have personally reviewed all specimens and or slides, including the listed special stains, and used them with my medical judgement to determine the final diagnosis.   Case was reviewed by the following:  Intraoperative Resident/Fellow: Brianna Harry MD  Resident Pathologist: Moraima Adamson MD  A resident or fellow in a training program was involved in the initial review, preparation, and/or interpretation of this case.  I, as the senior physician, attest that I have personally reviewed all specimens and or slides, including the listed special stains, and used them with my medical judgement to determine the final diagnosis.    Performing Labs  [facility]    The technical component of this testing was completed at [facility]                  Specimen Collected: 01/09/24 11:17 AM Last Resulted: 01/18/24  4:27 PM

## 2024-01-23 NOTE — LETTER
1/23/2024       RE: Brandan Alejandra  [address]  [place] [address]     Dear Colleague,    Thank you for referring your patient, Brandan Alejandra, to the [facility] ENDOCRINOLOGY CLINIC [employer] [facility]. Please see a copy of my visit note below.    Endocrine Consult Video visit note-     Attending Assessment/Plan :     Post surgical hypothyroidism- unstable.  On LT4 137 mcg/day (1.77 mcg/kg/day).   Treat to normal target TSH.  We will get him stabilized and then send him back to his PCP for long term management of this .  Labs in one month : TSH, free T4 .     S/p removal of Hurthle cell adenoma.    Low PTH noted post op.  He is not currently having low calcium symptoms   Reduce calcium now from 5 tablets tid to 3 tablets tid.     Prediabetes/ vs Diabetes mellitus type 2 with increased UAE on metformin . This is followed by his PCP Dr Noyola. He might benefit from medication management appt.  He had many questions about how to take his medication today.      Communication was difficult  for this appt.  He speaks English but I wasn't sure he was understanding me consistently.     Due to the COVID 19 pandemic this visit was a video visit in order to help prevent spread of infection in this patient and the general population. The patient gave verbal consent for the visit today. I have independently reviewed and interpreted labs, imaging as indicated.    Distant Location (provider location):  Off-site  Mode of Communication:  Video Conference via [employer]  Chart review/prep time 1  [id number]  Visit Start time  1800  Visit Stop time 1825   _67_ minutes spent on the date of the encounter doing chart review, history and exam, documentation and further activities as noted above.    Elana Sarabai MD    Chief complaint:  Brandan is a 63 year old male seen in consultation at the request of Dr Chris Mendez for thyroid mass   I have reviewed Care  Everywhere including [facility] lab reports, imaging reports and provider notes as indicated.      HISTORY OF PRESENT ILLNESS  Brandan has been under the care of endocrinologist Dr Kamron Ochoa in the [facility] system.  However, he moved to [facility] since then, so he now prefers care closer to home .     He first presented to [facility] on 9/8/23 with concern for hand swelling.  Right neck supraclavicular sternoclavicular  juntion mass was noted on examination.  US showed right thyroid nodule 4.5 cm, mixed.  FNAB 9/25/23 was suspicious for neoplasm .   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 He is on metfomrin.  He doesn't have a BS meter.  He doesn't check his BS.      Endocrine relevant labs are as follows:  9/13/21 HgbA1c 6.1%, UAE 34  9/8/23 HgbA1c 6.3%, TSH 1.67  10/10/23 calcitonin < 5  10/18/23 TSH 0.33, free T4 1.14 ng/dL, calcitonin < 5 , PTH 21.1, aldosterone 9.9, renin activity < 0.6, prolactin 3.2  LH 4.5, FSH 2.1,  testosterone 250 ng/dL, normetanephrine 0.33 nM, metanephrine < 0.2, UAE 35  1/9/24 Ca 8.4, PTH 11    Relevant imaging is as follows: (as read by me as it pertains to endocrine relevant organs)  9/12/23 thyroid US   Right inferior mixed nodule 4.5 x 3.4 x 4.3   9/25/23 FNAB suspicious for neoplasm- with differential oncocytic follicular neoplasm and MTC (as read by Coppell). As read at [facility] : follicular neoplasm with hurthle cell predominance  Left anterior border 0.7 x 0.5 x 0.8 isoechoic   9/15/23 CT chest with contrast :  large right mixed density thyroid mass   9/27/23 MR chest   10/23/23 left axillary mass  3 x 3.1 x 2.9 hypoechoic  FNAB   10/30/23     REVIEW OF SYSTEMS  Feel pretty good  Lost appetite; eating softer foods since surgery;   Weight  loss 6# since the surgery   Bedtime 7-8 PM; up at 3 -4 AM;   Surgical site is healing - still has a little bump; no pain -   Voice is lower volume - can't yell  No choking on water  Eyes: no known retinopathy - last saw eye doctor 2  years   Cardiac: negative  Respiratory: negative; might cough with reclining  at the dentist last week -   GI: diarrhea sometimes - ; BM x up to 2 times/day, including 3-4 AM; back to bed until 8 and then go again  after coffee   No paresthesias  No cramps   Worried medication makes his drowsy - metformin, high cholesterol  10 system ROS otherwise as per the HPI or negative    Past Medical History  Past Medical History:   Diagnosis Date    Headache     HTN (hypertension)     Insomnia     Microalbuminuria     Postoperative hypothyroidism 01/09/2024    Prediabetes 2021    Thalassemia     Thyroid mass 09/2023    Hurthle cell adenoma on surgical path     Past Surgical History:   Procedure Laterality Date    CHOLECYSTECTOMY, LAPOROSCOPIC  07/16/2020    INCISION AND DRAINAGE, ABSCESS, SIMPLE Right     RELEASE TRIGGER FINGER Right 12/15/2023    Procedure: RELEASE, RIGHT MIDDLE TRIGGER FINGER;  Surgeon: Umang Oneal MD;  Location: [facility] OR    REMOVE FOREIGN BODY FINGER Right 12/15/2023    Procedure: REMOVAL, FOREIGN BODY, RIGHT MIDDLE FINGER;  Surgeon: Umang Oneal MD;  Location: [facility] OR    THYROIDECTOMY Bilateral 1/9/2024    Procedure: total thyroidectomy and reimplantation of parathyroid;  Surgeon: Chris Mendez MD;  Location: [facility] OR     Medications  Current Outpatient Medications   Medication Sig Dispense Refill    acetaminophen (TYLENOL) 325 MG tablet Take 2 tablets (650 mg) by mouth every 4 hours as needed for mild pain 50 tablet 0    atorvastatin (LIPITOR) 40 MG tablet Take 1 tablet by mouth every evening      calcium carbonate (TUMS) 500 MG chewable tablet Take 3 tablets (1,500 mg) by mouth 3 times daily 300 tablet 0    levothyroxine (SYNTHROID/LEVOTHROID) 137 MCG tablet Take 1  "tablet (137 mcg) by mouth daily 90 tablet 4    losartan (COZAAR) 25 MG tablet Take 1 tablet by mouth daily      metFORMIN (GLUCOPHAGE) 500 MG tablet       ondansetron (ZOFRAN ODT) 4 MG ODT tab Take 1 tablet (4 mg) by mouth every 8 hours as needed for nausea 4 tablet 0    oxyCODONE (ROXICODONE) 5 MG tablet Take 1-2 tablets (5-10 mg) by mouth every 4 hours as needed for moderate to severe pain 6 tablet 0    senna-docusate (SENOKOT-S/PERICOLACE) 8.6-50 MG tablet Take 1-2 tablets by mouth 2 times daily 30 tablet 0     Calcium is currently 5 tablets three times/day  Metformin is 2 tablets once/day at HS     Allergies  No Known Allergies    Family History  Family History   Problem Relation Age of Onset    Cancer Mother     Verruca vulgaris Father     Thyroid Disease Daughter         thyroid surgery    Thyroid Cancer No family hx of        Social History  Social History     Tobacco Use    Smoking status: Former     Types: Cigarettes     Quit date:      Years since quittin.0    Smokeless tobacco: Never   Vaping Use    Vaping Use: Never used     Born in [place]; [place] to [place] in [employer]  Moved to [place] [facility] [facility] in November   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  BP Readings from Last 1 Encounters:   01/15/24 (!) 177/104      Pulse Readings from Last 1 Encounters:   01/15/24 102      Resp Readings from Last 1 Encounters:   01/10/24 17      Temp Readings from Last 1 Encounters:   24 97.6  F (36.4  C) (Oral)      SpO2 Readings from Last 1 Encounters:   01/15/24 96%      Wt Readings from Last 1 Encounters:   01/15/24 77.2 kg (170 lb 3.2 oz)      Ht Readings from Last 1 Encounters:   01/15/24 1.702 m (5' 7\")     GENERAL: no distress; I can see from upper chest up ; voice normal   SKIN: Visible skin clear. "   EYES: glasses; Eyes grossly normal to inspection.    NECK: visible goiter is not present; no visible neck masses; surgical site easily visible- low transverse scar.  No redness.    RESP: No audible wheeze, cough, or increased work of breathing.    NEURO: Awake, alert, responds appropriately to questions.  Mentation and speech fluent.  PSYCH:affect normal and appearance well-groomed.    DATA REVIEW    Pathology Consult: [id number]  Order: [id number]  Collected 10/27/2023  2:32 PM    Status: Final result    Visible to patient: Yes (seen)    0 Result Notes      Component    Case Report   Consult Report                                    Case: [id number]                                   Authorizing Provider:  Chris Mendez MD  Collected:           10/27/2023 02:32 PM           Ordering Location:     [facility]     Received:            10/27/2023 02:32 PM                                  [facility]                                                       Pathologist:           Jose Mendieta III, MD                                                       Specimen:    Slide(s), [id number]                                                                      Final Diagnosis   CASE FROM [place], [place] ([id number], OBTAINED 09/25/2023):  THYROID RIGHT, RIGHT THYROID NODULE, FINE NEEDLE ASPIRATION  - Follicular neoplasm with Hurthle cell predominance (Thyroid [place] Category IV) - see comment    Electronically signed by Jose Mendieta III, MD on 10/30/2023 at  9:48 AM   Comment    We concur with the referring pathologist's interpretation and appreciate the opportunity to consult on this specimen. Cytological preparations show clusters and sheets of follicular epithelial cells with oncocytic morphology, supporting the above diagnosis.   THE [place] IMPLIED RISK OF MALIGNANCY AND RECOMMENDED CLINICAL MANAGEMENT:  Follicular neoplasm has a 25-40% risk of malignancy,  recommended management is surgical lobectomy or molecular testing.   Original Case ID    [id number]   Material Submitted    10 slides   Clinical Information    The patient is a 63-year-old female with a complex nontoxic single thyroid nodule    Gross Description    Received from [facility] in [place], [place] are 10 stained slides labeled [id number] (obtained 09/25/2023), and copies of the referring pathologist's reports with patient identifying information.  All slides are returned.    Microscopic Description    A microscopic examination was performed.   Case was reviewed by the following:  Pathology Fellow: Aneudy Bean MD  A resident or fellow in a training program was involved in the initial review, preparation, and/or interpretation of this case.  I, as the senior physician, attest that I have personally reviewed all specimens and or slides, including the listed special stains, and used them with my medical judgement to determine the final diagnosis.   Performing Labs    The technical component of this testing was completed at [facility]   Resulting Agency [facility]              Specimen Collected: 10/27/23  2:32 PM Last Resulted: 10/30/23  9:48 AM                 Surgical Pathology Exam: [id number]  Order: [id number]  Collected 1/9/2024 11:17 AM    Status: Final result    Visible to patient: Yes (not seen)    0 Result Notes   1 Patient Communication        Component  Resulting Agency    Case Report    Surgical Pathology Report                         Case: [id number]                                    Authorizing Provider:  Chris Mendez MD  Collected:           01/09/2024 11:18 AM            Ordering Location:      MAIN OR                 Received:            01/09/2024 11:24 AM            Pathologist:           Kalina Mae MD                                                    Intraop:               Wang  "MD Praveena                                                              Specimens:   A) - Other, Evaluate for Left Inferior Parathyroid                                                   B) - Thyroid, Total Thyroidectomy, stitch left superior pole                                Final Diagnosis    A.  PARATHYROID GLAND, LEFT INFERIOR,  BIOPSY:  -Parathyroid gland tissue with no histopathologic abnormality.  B.  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  -The neoplasm is located within the lower right thyroid lobe and isthmus, and is encapsulated, with no extension into surrounding thyroid gland.  -Margins are negative.  -Biopsy site changes.  -Two benign colloid nodules present within left lobe, 0.9 cm and 0.4 cm in linear extent.  -Background thyroid parenchyma with no other histopathologic abnormality.  -One benign perithyroidal lymph node.  -No evidence of malignancy identified.    Electronically signed by Kalina Mae MD on 1/18/2024 at  4:27 PM    Clinical Information  [facility]    63 year old male with incidental right thyroid nodule. 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 Molecular testing vs hemithyroidectomy vs total thyroidectomy were discussed with the patient. He wished to proceed with total thyroidectomy.    Intraoperative Consultation  REY TUTTLE(2). Other, Evaluate for Left Inferior Parathyroid:  AFS1:  - Parathyroid gland      Praveena Piña MD on 1/9/2024 at 11:37 AM  [facility] op performed at[facility]  Intra-op Dx verbally delivered to Dr Andrea TUTTLE(2). Other, Evaluate for Left Inferior Parathyroid:  The specimen is received fresh for frozen section, with proper patient identification, labeled \"evaluate for left inferior parathyroid\".  It consists of a a less than 0.1 g, 0.2 cm in greatest dimension pink-red soft tissue.  The specimen is submitted entirely for frozen section.  The remainder of the frozen section " "block is submitted as A1 FS.  B(1). Thyroid, Total Thyroidectomy, stitch left superior pole:  The specimen is received fresh with proper patient identification labeled \"total thyroidectomy, stitch left superior pole\".  The specimen consists of 50.98 g, 6.2 cm transversely by 7.2 cm superior to inferior by 3.3 cm anterior to posterior total thyroidectomy, which is oriented per the requisition as stitch left superior pole. The right thyroid lobe is 7.2 cm superior to inferior by 2.8 cm transversely by 3.3 cm anterior to posterior, the isthmus is 3.4 cm superior to inferior by 1.8 cm transversely by 1.2 cm anterior to posterior, and the left thyroid lobe is 4.7 cm superior to inferior by 2.0 cm transversely by 2.3 cm anterior to posterior.  There is a pyramidal lobe located close to the right lobe measuring 2.1 cm from superior to inferior by 0.7 cm transverse by 0.1 cm from anterior to posterior.  There is a gray-tan possible lymph node attached to the isthmus superiorly measuring 0.5 x 0.4 x 0.3 cm. The thyroid capsule is red-brown and intact.   The specimen is inked as follows: Anterior right lobe-blue, anterior isthmus-orange, anterior left lobe-green, and entire posterior aspect-black.  The specimen is serially sectioned from superior to inferior.  Sectioning the right thyroid lobe and isthmus reveals a 3.8 x 3.1 x 2.4 cm brown-tan, soft, hemorrhagic, necrotic capsulated mass, which is located within the mid to inferior pole.  The mass consists of multiple cavities of different sizes.  The mass is 0.1 cm from the anterior surface of the isthmus and abutting the anterior surface of the right lobe, and abutting posterior surface surfaces of the right lobe and isthmus.  The mass is encapsulated however there is an area of potential capsular disruption identified grossly. Gross extension through the thyroid capsule is not identified.  Sectioning the left thyroid lobe reveals a 0.6 x 0.5 x 0.9 cm well-circumscribed " encapsulated mass, filled with gelatinous transparent material, the mass is located in the superior part of the upper lobe.  The mass is abutting the anterior surface of the left lobe and is 0.5 cm from the posterior surface of the left lobe and 1.1 cm from the isthmus. The remainder of the cut surfaces are red-brown and glistening with no additional masses or lesions.  Representative tissue submitted:  Summary of sections  B1-B2: Representative sections of normal thyroid tissue, superior right lobe  B3: Mass with anterior and posterior surfaces  B4-B5: Section of the mass in the right lobe with anterior and posterior surfaces  B6-B15: Entire capsule of the mass from the lobe  B16-B22: Entire lesion with the capsule from the isthmus  B23-B26: Entire capsulated lesion from left lobe  B27: Representative section of normal thyroid tissue from left lobe    Microscopic Description  [facility]    Microscopic examination has been performed. BRAF V600E immunohistochemical stain with appropriate control was performed on block B17 and the tumor cells are negative.  Representative blocks are B12, B13, B15, and B17.  I have personally reviewed all specimens and or slides, including the listed special stains, and used them with my medical judgement to determine the final diagnosis.   Case was reviewed by the following:  Intraoperative Resident/Fellow: Brianna Harry MD  Resident Pathologist: Moraima Adamson MD  A resident or fellow in a training program was involved in the initial review, preparation, and/or interpretation of this case.  I, as the senior physician, attest that I have personally reviewed all specimens and or slides, including the listed special stains, and used them with my medical judgement to determine the final diagnosis.    Performing Labs  [facility]    The technical component of this testing was completed at [facility]                  Specimen  Collected: 01/09/24 11:17 AM Last Resulted: 01/18/24  4:27 PM             Virtual Visit Details    Type of service:  Video Visit

## 2024-01-23 NOTE — TELEPHONE ENCOUNTER
Called and spoke to patient's daughter regarding dates needed for FMLA paperwork. Confirmed 1/9/24-1/29/24 for time off work. Informed patient I will completed paperwork with these dates and get it faxed to employer.     Jessica Rey, RN, BSN  RN Care Coordinator, ENT Clinic

## 2024-01-24 NOTE — PATIENT INSTRUCTIONS
Labs in one month -     Reduce calcium to 3 tablets three times/day    If you get low calcium symptoms (muscle cramps, numbness/ tingling) it is a sign you need more calcium and you can take extra calcium.  Let me know if that happens.           Information for patients on Levo-thyroxine      The thyroid hormone, Levo-thyroxine (L-T4) is one of the main hormones normally produced by the thyroid.  The drug is identical in chemical structure to the natural product.  Levothyroxine is used to treat hypothyroidism (underactive thyroid).  Sometimes it is used even when the thyroid is not underactive, to treat a goiter (enlarged thyroid) or a thyroid nodule.  For patients with a history of thyroid removal, L-T4 is used to replace the deficiency created by the surgery.    The purpose of giving L-T4 to treat hypothyroidism is to make up for the thyroid hormone previously produced by your thyroid before it failed.  Depending on the extent of your thyroid failure, you may require a higher or a lower dose of L-T4.  The goal is to make your blood level of TSH (a hormone made by the master gland, the pituitary, the gland which controls and judges the thyroid) normal.    This drug is usually well-tolerated and safe but it is important to take the proper dose for YOU.  This involves periodic measurements of TSH, usually within 1-2 months of a dose change.  You should be off biotin containing supplements for at least one week prior to thyroid blood tests.    You should alert your doctor if you have signs you are getting too much L-T4.  These include excessive nervousness, heart fluttering or skipping beats, diarrhea, or feeling too hot and/or sweaty.      There are many brand names for Levo-thyroxine (L-T4), including Synthroid, Levoxyl, Levothroid, Unithroid, Tirosint and others.  Changing from one brand name thyroid hormone product to another or to a generic product (all generics are called levothyroxine) can cause changes in your  thyroid hormone balance, even if the dose stays the same.  Since generic products can come from many different companies (such as Sandoz, Mylan, [employer] and others), there may be less consistency among the different generic preparations.  The decision to change brands or to change to a generic product must be made with your physician or other caregiver.  Follow-up testing should be arranged to monitor for any needed adjustments.  Monitoring may need to be more frequent if you always receive a generic thyroid hormone product.    For proper thyroid hormone balance the dose of thyroid hormone in your pills must be precise and consistent.    Be sure to take the medication as prescribed on an empty stomach, and at least 4 hours apart from calcium supplements, iron and soy products.  Store the medication away from severe heat and humidity.    Using a pill tray can help you keep track of your medication.  Specifically, if you get a pill tray that has all days of the week (Sunday-Saturday) and also 4 boxes for each day (often labeled as breafkast, lunch, dinner and bedtime) you can use the box to fill your once/day  pills for a whole month.  If you miss a levothyroxine or vitamin D dose you can take 2 the next day.    You should be OFF any biotin containing supplements at least 7 days prior to thyroid lab draws.       Many insurance companies and other providers charge higher co-payments for brand name medications.  Since brand name L-T4 is not very expensive, be sure to ask if the actual cost of buying the medication is less than the co-payment.  In some instances the charge for a co-payment may be greater than buying the drug outright, especially if the prescription needs to be refilled every 30 days.

## 2024-01-29 ENCOUNTER — DOCUMENTATION ONLY (OUTPATIENT)
Dept: OTOLARYNGOLOGY | Facility: CLINIC | Age: 64
End: 2024-01-29
Payer: COMMERCIAL

## 2024-01-29 NOTE — PROGRESS NOTES
Updated [facility] paperwork faxed to Lilia Diallo at [facility]. Fax number [phone]. 7 [facility] faxed.

## 2024-02-05 ENCOUNTER — TELEPHONE (OUTPATIENT)
Dept: ENDOCRINOLOGY | Facility: CLINIC | Age: 64
End: 2024-02-05
Payer: COMMERCIAL

## 2024-02-05 NOTE — TELEPHONE ENCOUNTER
CONRADM and sent myc x1 to schedule lab draw at the end of February     Geoffrey Beaulieu on 2/5/2024 at 11:57 AM

## 2024-02-07 ENCOUNTER — TELEPHONE (OUTPATIENT)
Dept: ORTHOPEDICS | Facility: CLINIC | Age: 64
End: 2024-02-07
Payer: COMMERCIAL

## 2024-02-07 ENCOUNTER — TELEPHONE (OUTPATIENT)
Dept: ENDOCRINOLOGY | Facility: CLINIC | Age: 64
End: 2024-02-07
Payer: COMMERCIAL

## 2024-02-07 NOTE — TELEPHONE ENCOUNTER
Patient call:     Appointment type: LABS  Provider: Cornell   Return date: End of February   Speciality phone number: [phone]   Additional appointment(s) needed: N/A   Additional notes: LVM x2, MyC x1  [facility]  Labs in one month - checkout notes    Teresa Arce on 2/7/2024 at 1:46 PM

## 2024-02-07 NOTE — TELEPHONE ENCOUNTER
Mailed letter to advise that the surgery has to change dates. Patient is not responding to phone calls or [employer] messages.

## 2024-02-07 NOTE — LETTER
February 7, 2024      Brandan Alejandra  [address] [place] [facility] [address]              Dear Brandan,    We have been trying to get a hold of you regarding your surgery with Dr. Umang Oneal on March 7th, 2024. Dr. Umang Oneal will not be available on March 7th. We can reschedule you for March 8th if this works for your schedule.     Please call us at [phone] to discuss your options.        Sincerely,      Jerri GUPTA  [facility]  Surgery Schedule Coordinator

## 2024-03-04 RX ORDER — LIDOCAINE HYDROCHLORIDE AND EPINEPHRINE 10; 10 MG/ML; UG/ML
10 INJECTION, SOLUTION INFILTRATION; PERINEURAL ONCE
OUTPATIENT
Start: 2024-03-08

## 2024-04-02 ENCOUNTER — TELEPHONE (OUTPATIENT)
Dept: ORTHOPEDICS | Facility: CLINIC | Age: 64
End: 2024-04-02

## 2024-04-02 NOTE — TELEPHONE ENCOUNTER
Paperwork received.  The  had requested a letter from Dr Oneal with several questions to answer.  Also they requested medical records.    PORFIRIO sheet printed and faxed to HIM to send records. Request for letter sent to Dr Oneal via email attachment.  Paloma Jones RN

## 2024-04-02 NOTE — TELEPHONE ENCOUNTER
Other: Pt's daughter is following up on the written report and how it relates to WC.  Pt has court soon, the forms were originally sent in December.  Please check to see if they have been completed and fax to the  at [phone].  Pt's daughter is having the  refax the forms today. Please confirm the ppwk has been received. Thanks.      Could we send this information to you in [employer] or would you prefer to receive a phone call?:   Patient would like to be contacted via [employer]

## 2024-04-09 NOTE — TELEPHONE ENCOUNTER
Dr Oneal reviewed, forwarded letter to his Admin [facility] for the Orthopedic Department to work with the law firm on billing for services and providing requested information. Paloma Jones RN

## 2024-07-08 ENCOUNTER — MYC REFILL (OUTPATIENT)
Dept: ENDOCRINOLOGY | Facility: CLINIC | Age: 64
End: 2024-07-08

## 2024-07-08 DIAGNOSIS — C73 THYROID CANCER (H): Primary | ICD-10-CM

## 2024-07-08 DIAGNOSIS — E89.0 POST-SURGICAL HYPOTHYROIDISM: ICD-10-CM

## 2024-07-08 RX ORDER — LEVOTHYROXINE SODIUM 137 UG/1
137 TABLET ORAL DAILY
Qty: 90 TABLET | Refills: 4 | OUTPATIENT
Start: 2024-07-08

## 2024-07-10 ENCOUNTER — PATIENT OUTREACH (OUTPATIENT)
Dept: CARE COORDINATION | Facility: CLINIC | Age: 64
End: 2024-07-10

## 2024-07-10 ENCOUNTER — OFFICE VISIT (OUTPATIENT)
Dept: INTERNAL MEDICINE | Facility: CLINIC | Age: 64
End: 2024-07-10

## 2024-07-10 ENCOUNTER — NURSE TRIAGE (OUTPATIENT)
Dept: NURSING | Facility: CLINIC | Age: 64
End: 2024-07-10

## 2024-07-10 VITALS
HEIGHT: 67 IN | WEIGHT: 165 LBS | DIASTOLIC BLOOD PRESSURE: 82 MMHG | TEMPERATURE: 98.2 F | RESPIRATION RATE: 16 BRPM | OXYGEN SATURATION: 98 % | BODY MASS INDEX: 25.9 KG/M2 | SYSTOLIC BLOOD PRESSURE: 120 MMHG | HEART RATE: 74 BPM

## 2024-07-10 DIAGNOSIS — E89.0 POSTOPERATIVE HYPOTHYROIDISM: ICD-10-CM

## 2024-07-10 DIAGNOSIS — D56.9 THALASSEMIA, UNSPECIFIED TYPE: ICD-10-CM

## 2024-07-10 DIAGNOSIS — Z13.9 ENCOUNTER FOR SCREENING INVOLVING SOCIAL DETERMINANTS OF HEALTH (SDOH): ICD-10-CM

## 2024-07-10 DIAGNOSIS — R79.89 LOW SERUM PARATHYROID HORMONE (PTH): ICD-10-CM

## 2024-07-10 DIAGNOSIS — E89.0 POSTOPERATIVE HYPOTHYROIDISM: Primary | ICD-10-CM

## 2024-07-10 DIAGNOSIS — E78.5 HYPERLIPIDEMIA LDL GOAL <70: Primary | ICD-10-CM

## 2024-07-10 DIAGNOSIS — I10 ESSENTIAL HYPERTENSION: ICD-10-CM

## 2024-07-10 DIAGNOSIS — C73 THYROID CANCER (H): ICD-10-CM

## 2024-07-10 DIAGNOSIS — R73.03 PREDIABETES: ICD-10-CM

## 2024-07-10 DIAGNOSIS — E87.5 HYPERKALEMIA: Primary | ICD-10-CM

## 2024-07-10 LAB
ANION GAP SERPL CALCULATED.3IONS-SCNC: 14 MMOL/L (ref 7–15)
BUN SERPL-MCNC: 7.8 MG/DL (ref 8–23)
CALCIUM SERPL-MCNC: 9.3 MG/DL (ref 8.8–10.2)
CALCIUM SERPL-MCNC: 9.3 MG/DL (ref 8.8–10.2)
CHLORIDE SERPL-SCNC: 101 MMOL/L (ref 98–107)
CHOLEST SERPL-MCNC: 132 MG/DL
CREAT SERPL-MCNC: 1.17 MG/DL (ref 0.67–1.17)
DEPRECATED HCO3 PLAS-SCNC: 25 MMOL/L (ref 22–29)
EGFRCR SERPLBLD CKD-EPI 2021: 71 ML/MIN/1.73M2
ERYTHROCYTE [DISTWIDTH] IN BLOOD BY AUTOMATED COUNT: 19.5 % (ref 10–15)
FASTING STATUS PATIENT QL REPORTED: YES
FASTING STATUS PATIENT QL REPORTED: YES
GLUCOSE SERPL-MCNC: 87 MG/DL (ref 70–99)
HBA1C MFR BLD: 5.9 % (ref 0–5.6)
HCT VFR BLD AUTO: 37.6 % (ref 40–53)
HDLC SERPL-MCNC: 66 MG/DL
HGB BLD-MCNC: 12.6 G/DL (ref 13.3–17.7)
LDLC SERPL CALC-MCNC: 45 MG/DL
MCH RBC QN AUTO: 19.9 PG (ref 26.5–33)
MCHC RBC AUTO-ENTMCNC: 33.5 G/DL (ref 31.5–36.5)
MCV RBC AUTO: 59 FL (ref 78–100)
NONHDLC SERPL-MCNC: 66 MG/DL
PHOSPHATE SERPL-MCNC: 3.5 MG/DL (ref 2.5–4.5)
PLATELET # BLD AUTO: 215 10E3/UL (ref 150–450)
POTASSIUM SERPL-SCNC: 6.3 MMOL/L (ref 3.4–5.3)
PTH-INTACT SERPL-MCNC: 32 PG/ML (ref 15–65)
RBC # BLD AUTO: 6.34 10E6/UL (ref 4.4–5.9)
SODIUM SERPL-SCNC: 140 MMOL/L (ref 135–145)
T4 FREE SERPL-MCNC: <0.1 NG/DL (ref 0.9–1.7)
TRIGL SERPL-MCNC: 105 MG/DL
TSH SERPL DL<=0.005 MIU/L-ACNC: 92.1 UIU/ML (ref 0.3–4.2)
WBC # BLD AUTO: 4.1 10E3/UL (ref 4–11)

## 2024-07-10 PROCEDURE — 83036 HEMOGLOBIN GLYCOSYLATED A1C: CPT | Performed by: INTERNAL MEDICINE

## 2024-07-10 PROCEDURE — 80048 BASIC METABOLIC PNL TOTAL CA: CPT | Performed by: INTERNAL MEDICINE

## 2024-07-10 PROCEDURE — 84443 ASSAY THYROID STIM HORMONE: CPT | Performed by: INTERNAL MEDICINE

## 2024-07-10 PROCEDURE — 80061 LIPID PANEL: CPT | Performed by: INTERNAL MEDICINE

## 2024-07-10 PROCEDURE — G2211 COMPLEX E/M VISIT ADD ON: HCPCS | Performed by: INTERNAL MEDICINE

## 2024-07-10 PROCEDURE — 36415 COLL VENOUS BLD VENIPUNCTURE: CPT | Performed by: INTERNAL MEDICINE

## 2024-07-10 PROCEDURE — 84439 ASSAY OF FREE THYROXINE: CPT | Performed by: INTERNAL MEDICINE

## 2024-07-10 PROCEDURE — 85027 COMPLETE CBC AUTOMATED: CPT | Performed by: INTERNAL MEDICINE

## 2024-07-10 PROCEDURE — 99214 OFFICE O/P EST MOD 30 MIN: CPT | Performed by: INTERNAL MEDICINE

## 2024-07-10 PROCEDURE — 83970 ASSAY OF PARATHORMONE: CPT | Performed by: INTERNAL MEDICINE

## 2024-07-10 PROCEDURE — 84100 ASSAY OF PHOSPHORUS: CPT | Performed by: INTERNAL MEDICINE

## 2024-07-10 RX ORDER — LEVOTHYROXINE SODIUM 137 UG/1
137 TABLET ORAL DAILY
Qty: 90 TABLET | Refills: 2 | Status: SHIPPED | OUTPATIENT
Start: 2024-07-10

## 2024-07-10 RX ORDER — ATORVASTATIN CALCIUM 40 MG/1
40 TABLET, FILM COATED ORAL EVERY EVENING
Qty: 90 TABLET | Refills: 3 | Status: SHIPPED | OUTPATIENT
Start: 2024-07-10

## 2024-07-10 RX ORDER — LOSARTAN POTASSIUM 25 MG/1
25 TABLET ORAL DAILY
Qty: 90 TABLET | Refills: 3 | Status: SHIPPED | OUTPATIENT
Start: 2024-07-10

## 2024-07-10 NOTE — ASSESSMENT & PLAN NOTE
Lipids Tchol 125, , HDL 41, LDL 52 (9/2023).  Well-controlled on current regimen.  -Continue atorvastatin 40 mg daily  -Repeat lipids with labs today

## 2024-07-10 NOTE — ASSESSMENT & PLAN NOTE
No prior electrophoresis.  Consider getting 1 in the future, we will hold off as he is uninsured at the moment.  - Repeat CBC today

## 2024-07-10 NOTE — PATIENT INSTRUCTIONS
To schedule your colonoscopy you can call [phone].     Work to get 64 ounces of water a day.

## 2024-07-10 NOTE — ASSESSMENT & PLAN NOTE
Was to be taking calcium supplements, stopped after supply ran out a few months ago.   - Repeat calcium today

## 2024-07-10 NOTE — Clinical Note
Hi Dr. Sarabia,   I saw Brandan today for general follow up. Unfortunately, there was some confusion between him and the pharmacy and he was unable to get any refills of his levothyroxine (despite the fact you had ordered with 4 refills available back in January). So, he has been out of LT4 replacement for at least 3 months. Understandably feeling poorly today. I had him get labs from you drawn and refilled the same 137 mcg daily dose for him to  today but wanted to make you aware of why the labs will likely be off today.   Kandice Noyola MD

## 2024-07-10 NOTE — PROGRESS NOTES
Clinic Care Coordination Contact  [facility]/Voicemail    Clinical Data: Care Coordinator Outreach    Outreach Documentation Number of Outreach Attempt   7/10/2024   3:06 PM 1       Left message on patient's voicemail with call back information and requested return call.    Plan: Care Coordinator will try to reach patient again in 1-2 business days or on 7/11/24.    Order Questions    Question Answer   Reason for Referral: SDOH Concern    Financial Support   Financial Support: Medication Affordability    Insurance   Clinical Staff have discussed the Care Coordination Referral with the patient and/or caregiver: Yes   Additional Information: lots of confusion about how to fill medication, doesn't have insurance right now         Catherine Dunn  Community Health Worker   [facility] Care Coordination  [facility]   [email or web address]  Office: [phone]

## 2024-07-10 NOTE — ASSESSMENT & PLAN NOTE
Status post thyroidectomy 1/2024.  Unfortunately, has been completely out of Synthroid for the last at least 3 months.  Today he is feeling poorly, very tired and fatigued with some dizziness.  I do suspect that a lot of this is due to hypothyroidism.  Discussed with the patient the importance of taking his thyroid replacement every single day after having his thyroid removed.  - Did provide refill of levothyroxine 137 mcg daily  - Repeat thyroid labs today  - Will message Dr. Sarabia about situation and need for follow up as well

## 2024-07-10 NOTE — PROGRESS NOTES
Assessment & Plan   Problem List Items Addressed This Visit          Endocrine    Hyperlipidemia LDL goal <70 - Primary     Lipids Tchol 125, , HDL 41, LDL 52 (9/2023).  Well-controlled on current regimen.  -Continue atorvastatin 40 mg daily  -Repeat lipids with labs today          Relevant Medications    atorvastatin (LIPITOR) 40 MG tablet    Other Relevant Orders    Lipid panel reflex to direct LDL Non-fasting    Prediabetes     Current regimen is metformin 500 mg daily.  Will repeat A1c today.         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         Relevant Medications    levothyroxine (SYNTHROID/LEVOTHROID) 137 MCG tablet    Postoperative hypothyroidism     Status post thyroidectomy 1/2024.  Unfortunately, has been completely out of Synthroid for the last at least 3 months.  Today he is feeling poorly, very tired and fatigued with some dizziness.  I do suspect that a lot of this is due to hypothyroidism.  Discussed with the patient the importance of taking his thyroid replacement every single day after having his thyroid removed.  - Did provide refill of levothyroxine 137 mcg daily  - Repeat thyroid labs today  - Will message Dr. Sarabia about situation and need for follow up as well          Relevant Medications    levothyroxine (SYNTHROID/LEVOTHROID) 137 MCG tablet       Circulatory    Essential hypertension     Well-controlled on current regimen.  - Continue losartan 25 mg daily          Relevant Medications    losartan (COZAAR) 25 MG tablet    Other Relevant Orders    CBC with platelets    Basic metabolic panel       Hematologic    Thalassemia     No prior electrophoresis.  Consider getting 1 in the future, we will hold off as he is uninsured at the moment.  - Repeat CBC today            Other    Low serum parathyroid hormone (PTH)     " Was to be taking calcium supplements, stopped after supply ran out a few months ago.   - Repeat calcium today           Other Visit Diagnoses       Encounter for screening involving social determinants of health (SDoH)        Relevant Orders    Primary Care - Care Coordination Referral           The longitudinal plan of care for the diagnosis(es)/condition(s) as documented above were addressed during this visit. Due to the added complexity in care, I will continue to support Brandan in the subsequent management and with ongoing continuity of care.       BMI  Estimated body mass index is 25.84 kg/m  as calculated from the following:    Height as of this encounter: 1.702 m (5' 7\").    Weight as of this encounter: 74.8 kg (165 lb).   Weight management plan: Discussed healthy diet and exercise guidelines    FUTURE APPOINTMENTS:       - Follow-up visit in 2-3 months       Subjective   Brandan is a 63 year old, presenting for the following health issues:  Follow Up and Recheck Medication        7/10/2024     9:43 AM   Additional Questions   Roomed by Dioni Almeida   Accompanied by N/A     History of Present Illness     Brandan is here for general follow up today. Tells me his whole body has been feeling weak/tired. Maybe for a while. More tired after he eats. Very tired and sleepy when he has been walking. Has not taken thyroid medication for at least 3 months.  Tells me that the pharmacy told him there were no refills available after the first time he filled his levothyroxine in January.    Pre-DM/DM: metformin 500 mg daily    HTN: losartan 25 mg daily, BP today 120/82    Thyroid cancer: S/p thyroidectomy, path showed Hurthle cell adenoma   - Saw endo 1/23/24. Reduce calcium 5 tablets TID to 3 tablets TID, continued on levothyroxine 137 mcg daily   - Due for repeat labs   - Not currently taking TUMS b/c he says he finished the bottle she gave     HLD: atorvastatin 40 mg daily     He eats 0-1 servings of fruits and vegetables " "daily.He consumes 0 sweetened beverage(s) daily.He exercises with enough effort to increase his heart rate 60 or more minutes per day.  He exercises with enough effort to increase his heart rate 6 days per week.   He is taking medications regularly.      Review of Systems  Constitutional, neuro, ENT, endocrine, pulmonary, cardiac, gastrointestinal, genitourinary, musculoskeletal, integument and psychiatric systems are negative, except as otherwise noted.      Objective    /82 (BP Location: Right arm, Patient Position: Sitting, Cuff Size: Adult Regular)   Pulse 74   Temp 98.2  F (36.8  C) (Oral)   Resp 16   Ht 1.702 m (5' 7\")   Wt 74.8 kg (165 lb)   SpO2 98%   BMI 25.84 kg/m    Body mass index is 25.84 kg/m .  Physical Exam   GENERAL: alert and no distress  EYES: Eyes grossly normal to inspection and conjunctivae and sclerae normal  HENT: nose and mouth without ulcers or lesions  NECK: no adenopathy, no asymmetry, masses, well healed surgical scar  RESP: lungs clear to auscultation - no rales, rhonchi or wheezes  CV: regular rate and rhythm, normal S1 S2, no S3 or S4, no murmur, click or rub, no peripheral edema  ABDOMEN: soft, nontender  MS: no gross musculoskeletal defects noted, no edema  SKIN: no suspicious lesions or rashes  NEURO: Normal strength and tone, mentation intact and speech normal  PSYCH: mentation appears normal, affect normal/bright    Results for orders placed or performed in visit on 07/10/24 (from the past 24 hour(s))   Hemoglobin A1c   Result Value Ref Range    Hemoglobin A1C 5.9 (H) 0.0 - 5.6 %           Signed Electronically by: Kandice Noyola MD    "

## 2024-07-10 NOTE — LETTER
[facility] CARE COORDINATION  [facility]   July 11, 2024    Brandan Nakulgsoukiel  [address] [place] [address]      Dear Brandan,    I am a clinic care coordinator who works with Kandice Noyola MD with the [facility]. I have been trying to reach you recently to introduce Clinic Care Coordination. Below is a description of clinic care coordination and how I can further assist you.       The clinic care coordination team is made up of a registered nurse, , financial resource worker and community health worker who understand the health care system. The goal of clinic care coordination is to help you manage your health and improve access to the health care system. Our team works alongside your provider to assist you in determining your health and social needs. We can help you obtain health care and community resources, providing you with necessary information and education. We can work with you through any barriers and develop a care plan that helps coordinate and strengthen the communication between you and your care team.  Our services are voluntary and are offered without charge to you personally.    Please feel free to contact me with any questions or concerns regarding care coordination and what we can offer.      We are focused on providing you with the highest-quality healthcare experience possible.    Sincerely,     Catherine Dunn  Community Health Worker   [facility] Care Coordination  [facility]   [email or web address]  Office: [phone]

## 2024-07-11 ENCOUNTER — LAB (OUTPATIENT)
Dept: LAB | Facility: CLINIC | Age: 64
End: 2024-07-11

## 2024-07-11 ENCOUNTER — TELEPHONE (OUTPATIENT)
Dept: ENDOCRINOLOGY | Facility: CLINIC | Age: 64
End: 2024-07-11

## 2024-07-11 DIAGNOSIS — E87.5 HYPERKALEMIA: ICD-10-CM

## 2024-07-11 DIAGNOSIS — E89.0 POSTOPERATIVE HYPOTHYROIDISM: ICD-10-CM

## 2024-07-11 LAB
ANION GAP SERPL CALCULATED.3IONS-SCNC: 10 MMOL/L (ref 7–15)
BUN SERPL-MCNC: 8.3 MG/DL (ref 8–23)
BURR CELLS BLD QL SMEAR: SLIGHT
CALCIUM SERPL-MCNC: 9 MG/DL (ref 8.8–10.2)
CHLORIDE SERPL-SCNC: 102 MMOL/L (ref 98–107)
CREAT SERPL-MCNC: 1.2 MG/DL (ref 0.67–1.17)
DACRYOCYTES BLD QL SMEAR: SLIGHT
DEPRECATED HCO3 PLAS-SCNC: 29 MMOL/L (ref 22–29)
EGFRCR SERPLBLD CKD-EPI 2021: 69 ML/MIN/1.73M2
GLUCOSE SERPL-MCNC: 93 MG/DL (ref 70–99)
PATH REV: ABNORMAL
PLAT MORPH BLD: ABNORMAL
POTASSIUM SERPL-SCNC: 4.1 MMOL/L (ref 3.4–5.3)
RBC MORPH BLD: ABNORMAL
SODIUM SERPL-SCNC: 141 MMOL/L (ref 135–145)
T4 FREE SERPL-MCNC: 0.43 NG/DL (ref 0.9–1.7)
TARGETS BLD QL SMEAR: ABNORMAL
TSH SERPL DL<=0.005 MIU/L-ACNC: 92.4 UIU/ML (ref 0.3–4.2)

## 2024-07-11 PROCEDURE — 80048 BASIC METABOLIC PNL TOTAL CA: CPT

## 2024-07-11 PROCEDURE — 36415 COLL VENOUS BLD VENIPUNCTURE: CPT

## 2024-07-11 PROCEDURE — 84439 ASSAY OF FREE THYROXINE: CPT

## 2024-07-11 PROCEDURE — 84443 ASSAY THYROID STIM HORMONE: CPT

## 2024-07-11 NOTE — PROGRESS NOTES
Clinic Care Coordination Contact  [facility]/Voicemail    Clinical Data: Care Coordinator Outreach    Outreach Documentation Number of Outreach Attempt   7/10/2024   3:06 PM 1   7/11/2024   1:55 PM 2       Left message on patient's voicemail with call back information and requested return call.    Plan: Care Coordinator will send care coordination introduction letter with care coordinator contact information and explanation of care coordination services via [employer]. Care Coordinator will do no further outreaches at this time.      Catherine Dunn  Community Health Worker   [facility] Care Coordination  [facility]   [email or web address]  Office: [phone]

## 2024-07-11 NOTE — TELEPHONE ENCOUNTER
CONRADM with questions from Dr Sarabia.   [employer] message sent.   Tomasa Moise RN on 7/11/2024 at 10:44 AM     RE    Can you confirm how long you have been off the treatment?     When is the last time you took the levothyroxine?     Are you still taking calcium?         Please call Brandan.  Tell him I have seen the thyroid blood test from today.  I was expecting him to get labs in February, not July.    It appears you haven't been taking the levothyroxine thyroid medication. Can you confirm how long you have been off the treatment?   When is the last time you took the levothyroxine?   I see that Dr Noyola rewrote the Rx for levothrroxine.  You should get repeat thyroid blood test after you have been taking it for one month.  I am placing orders on the system  Are you still taking calcium?  If so, you can reduce to one calcium tablet/day.    Thanks  Elana Sarabia MD

## 2024-07-11 NOTE — TELEPHONE ENCOUNTER
DATE/TIME OF CALL RECEIVED FROM LAB:  07/10/24 at 7:19 PM   LAB TEST:  Potassium   LAB VALUE:  6.3   PROVIDER NOTIFIED?: Yes  PROVIDER NAME: Dr. Tammy Pantoja  DATE/TIME LAB VALUE REPORTED TO PROVIDER: 7/10/24 19  MECHANISM OF PROVIDER NOTIFICATION: Phone Call  PROVIDER RESPONSE: patient to have redraw on 7/11/24. Advised patient to present to ED if feeling unwell.     Provider Recommendation Follow Up:   Reached patient/caregiver. Informed of provider's recommendations. Patient verbalized understanding and agrees with the plan.       Reason for Disposition    Lab or radiology calling with CRITICAL test results    Protocols used: PCP Call - No Triage-A-

## 2024-07-12 ENCOUNTER — TELEPHONE (OUTPATIENT)
Dept: INTERNAL MEDICINE | Facility: CLINIC | Age: 64
End: 2024-07-12

## 2024-07-12 NOTE — TELEPHONE ENCOUNTER
LMTCB. Please relay result message below to patient if call is returned and please assist in scheduling patient for lab appt for 1 month.     Thank you

## 2024-07-12 NOTE — TELEPHONE ENCOUNTER
----- Message from Kandice Noyola sent at 7/11/2024  7:31 PM CDT -----  Please call patient. Potassium is normal on recheck, no concerns.     Main reason for call is that the lab incorrectly simone his repeat thyroid labs for Dr. Sarabia today. He needs to come back in 1 month to repeat those labs. Please help him get lab appt in one month time for repeat thyroid labs after resuming his synthroid. Also, please confirm he was able to  synthroid from the pharmacy.

## 2024-07-12 NOTE — TELEPHONE ENCOUNTER
Patient returning call. Reviewed result message below with him.     Pt has lab appt scheduled for 8/12.     Patient verbalized understanding and thanked for the call.

## 2024-07-15 ENCOUNTER — PATIENT OUTREACH (OUTPATIENT)
Dept: CARE COORDINATION | Facility: CLINIC | Age: 64
End: 2024-07-15

## 2024-07-15 NOTE — PROGRESS NOTES
Clinic Care Coordination Contact  [facility]/Voicemail    Clinical Data: Care Coordinator Outreach    Outreach Documentation Number of Outreach Attempt   7/10/2024   3:06 PM 1   7/11/2024   1:55 PM 2   7/15/2024   2:30 PM 3       Left message on patient's voicemail with call back information and requested return call.    Plan: Care Coordinator sent care coordination introduction letter on 7/10/24 via [employer]. Care Coordinator will do no further outreaches at this time.      Catherine Dunn  Community Health Worker   [facility] Care Coordination  [facility]   [email or web address]  Office: [phone]

## 2024-08-12 ENCOUNTER — LAB (OUTPATIENT)
Dept: LAB | Facility: CLINIC | Age: 64
End: 2024-08-12

## 2024-08-12 DIAGNOSIS — E89.0 POSTOPERATIVE HYPOTHYROIDISM: ICD-10-CM

## 2024-08-12 DIAGNOSIS — E89.0 POSTOPERATIVE HYPOTHYROIDISM: Primary | ICD-10-CM

## 2024-08-12 LAB
T4 FREE SERPL-MCNC: 2.03 NG/DL (ref 0.9–1.7)
TSH SERPL DL<=0.005 MIU/L-ACNC: 2.64 UIU/ML (ref 0.3–4.2)

## 2024-08-12 PROCEDURE — 84443 ASSAY THYROID STIM HORMONE: CPT

## 2024-08-12 PROCEDURE — 36415 COLL VENOUS BLD VENIPUNCTURE: CPT

## 2024-08-12 PROCEDURE — 84439 ASSAY OF FREE THYROXINE: CPT

## 2024-10-11 ENCOUNTER — OFFICE VISIT (OUTPATIENT)
Dept: INTERNAL MEDICINE | Facility: CLINIC | Age: 64
End: 2024-10-11
Payer: COMMERCIAL

## 2024-10-11 VITALS
WEIGHT: 155 LBS | RESPIRATION RATE: 16 BRPM | SYSTOLIC BLOOD PRESSURE: 130 MMHG | HEART RATE: 74 BPM | OXYGEN SATURATION: 98 % | HEIGHT: 67 IN | BODY MASS INDEX: 24.33 KG/M2 | TEMPERATURE: 98.7 F | DIASTOLIC BLOOD PRESSURE: 82 MMHG

## 2024-10-11 DIAGNOSIS — R73.03 PREDIABETES: ICD-10-CM

## 2024-10-11 DIAGNOSIS — G47.19 EXCESSIVE DAYTIME SLEEPINESS: Primary | ICD-10-CM

## 2024-10-11 DIAGNOSIS — Z23 ENCOUNTER FOR VACCINATION: ICD-10-CM

## 2024-10-11 DIAGNOSIS — E78.5 HYPERLIPIDEMIA LDL GOAL <70: ICD-10-CM

## 2024-10-11 DIAGNOSIS — E89.0 POSTOPERATIVE HYPOTHYROIDISM: ICD-10-CM

## 2024-10-11 DIAGNOSIS — I10 ESSENTIAL HYPERTENSION: ICD-10-CM

## 2024-10-11 LAB
ALBUMIN SERPL BCG-MCNC: 4.7 G/DL (ref 3.5–5.2)
ALP SERPL-CCNC: 66 U/L (ref 40–150)
ALT SERPL W P-5'-P-CCNC: 45 U/L (ref 0–70)
ANION GAP SERPL CALCULATED.3IONS-SCNC: 7 MMOL/L (ref 7–15)
AST SERPL W P-5'-P-CCNC: 31 U/L (ref 0–45)
BILIRUB SERPL-MCNC: 1.4 MG/DL
BUN SERPL-MCNC: 12 MG/DL (ref 8–23)
CALCIUM SERPL-MCNC: 9.1 MG/DL (ref 8.8–10.4)
CHLORIDE SERPL-SCNC: 104 MMOL/L (ref 98–107)
CREAT SERPL-MCNC: 0.86 MG/DL (ref 0.67–1.17)
EGFRCR SERPLBLD CKD-EPI 2021: >90 ML/MIN/1.73M2
ERYTHROCYTE [DISTWIDTH] IN BLOOD BY AUTOMATED COUNT: 18.5 % (ref 10–15)
FERRITIN SERPL-MCNC: 740 NG/ML (ref 31–409)
GLUCOSE SERPL-MCNC: 82 MG/DL (ref 70–99)
HCO3 SERPL-SCNC: 28 MMOL/L (ref 22–29)
HCT VFR BLD AUTO: 40.2 % (ref 40–53)
HGB BLD-MCNC: 13.4 G/DL (ref 13.3–17.7)
IRON BINDING CAPACITY (ROCHE): 222 UG/DL (ref 240–430)
IRON SATN MFR SERPL: 45 % (ref 15–46)
IRON SERPL-MCNC: 100 UG/DL (ref 61–157)
MCH RBC QN AUTO: 20.3 PG (ref 26.5–33)
MCHC RBC AUTO-ENTMCNC: 33.3 G/DL (ref 31.5–36.5)
MCV RBC AUTO: 61 FL (ref 78–100)
PLATELET # BLD AUTO: 235 10E3/UL (ref 150–450)
POTASSIUM SERPL-SCNC: 4.4 MMOL/L (ref 3.4–5.3)
PROT SERPL-MCNC: 7.6 G/DL (ref 6.4–8.3)
RBC # BLD AUTO: 6.61 10E6/UL (ref 4.4–5.9)
SODIUM SERPL-SCNC: 139 MMOL/L (ref 135–145)
WBC # BLD AUTO: 5.4 10E3/UL (ref 4–11)

## 2024-10-11 PROCEDURE — 90471 IMMUNIZATION ADMIN: CPT | Performed by: INTERNAL MEDICINE

## 2024-10-11 PROCEDURE — 90480 ADMN SARSCOV2 VAC 1/ONLY CMP: CPT | Performed by: INTERNAL MEDICINE

## 2024-10-11 PROCEDURE — 99214 OFFICE O/P EST MOD 30 MIN: CPT | Mod: 25 | Performed by: INTERNAL MEDICINE

## 2024-10-11 PROCEDURE — 85027 COMPLETE CBC AUTOMATED: CPT | Performed by: INTERNAL MEDICINE

## 2024-10-11 PROCEDURE — 90673 RIV3 VACCINE NO PRESERV IM: CPT | Performed by: INTERNAL MEDICINE

## 2024-10-11 PROCEDURE — 80053 COMPREHEN METABOLIC PANEL: CPT | Performed by: INTERNAL MEDICINE

## 2024-10-11 PROCEDURE — 83540 ASSAY OF IRON: CPT | Performed by: INTERNAL MEDICINE

## 2024-10-11 PROCEDURE — 83550 IRON BINDING TEST: CPT | Performed by: INTERNAL MEDICINE

## 2024-10-11 PROCEDURE — 91320 SARSCV2 VAC 30MCG TRS-SUC IM: CPT | Performed by: INTERNAL MEDICINE

## 2024-10-11 PROCEDURE — 36415 COLL VENOUS BLD VENIPUNCTURE: CPT | Performed by: INTERNAL MEDICINE

## 2024-10-11 PROCEDURE — 82728 ASSAY OF FERRITIN: CPT | Performed by: INTERNAL MEDICINE

## 2024-10-11 RX ORDER — LEVOTHYROXINE SODIUM 137 UG/1
137 TABLET ORAL DAILY
Qty: 90 TABLET | Refills: 2 | Status: SHIPPED | OUTPATIENT
Start: 2024-10-11

## 2024-10-11 RX ORDER — LOSARTAN POTASSIUM 25 MG/1
25 TABLET ORAL DAILY
Qty: 90 TABLET | Refills: 3 | Status: SHIPPED | OUTPATIENT
Start: 2024-10-11

## 2024-10-11 RX ORDER — ATORVASTATIN CALCIUM 40 MG/1
40 TABLET, FILM COATED ORAL EVERY EVENING
Qty: 90 TABLET | Refills: 3 | Status: SHIPPED | OUTPATIENT
Start: 2024-10-11

## 2024-10-11 NOTE — ASSESSMENT & PLAN NOTE
Patient notes ongoing fatigue and sleepiness during the day. Discussed with him this is likely multifactorial. Thyroid function was quite low 3 months ago and may take his body a while to equilibrate and feel better after resuming synthroid. Additionally, STOP-BANG score of 4 day, possible sleep apnea. Also, just went back to work full-time.   - We are going to update some labs today to look for other potential causes of fatigue  - Sleep medicine referral today   - He will f/up with endocrine in a few months, if fatigue still present, could ask Dr. Sarabia if adding cytomel would make sense   - Try to not eat or drink within 2 hours of bedtime to reduce amount of times he is getting up to use the bathroom  - F/up 3-4 months

## 2024-10-11 NOTE — PROGRESS NOTES
Assessment & Plan   Problem List Items Addressed This Visit          Endocrine    Hyperlipidemia LDL goal <70     lipids 7/10/24 Tchol 132, , LDL 45, HDL 66  - Continue atorvastatin 40 mg daily         Relevant Medications    atorvastatin (LIPITOR) 40 MG tablet    Prediabetes     Well-controlled with A1c of 5.9 (7/10/24) on metformin 500 mg daily.         Relevant Medications    metFORMIN (GLUCOPHAGE) 500 MG tablet    Postoperative hypothyroidism     Status post thyroidectomy 1/2024. At last visit, had been out of Synthroid for 3 months and TSH was 92.  He has since resumed Synthroid 137 mcg daily and repeat labs 8/12/24 showed TSH 2.64, FT4 2.03.  - Continue synthroid 137 mcg daily  - Again provided refills at pharmacy as he has been having issues with them saying he doesn't have fills available  - F/up with endocrine as well          Relevant Medications    levothyroxine (SYNTHROID/LEVOTHROID) 137 MCG tablet       Circulatory    Essential hypertension     Well-controlled on current regimen.  - Continue losartan 25 mg daily          Relevant Medications    losartan (COZAAR) 25 MG tablet       Other    Excessive daytime sleepiness - Primary     Patient notes ongoing fatigue and sleepiness during the day. Discussed with him this is likely multifactorial. Thyroid function was quite low 3 months ago and may take his body a while to equilibrate and feel better after resuming synthroid. Additionally, STOP-BANG score of 4 day, possible sleep apnea. Also, just went back to work full-time.   - We are going to update some labs today to look for other potential causes of fatigue  - Sleep medicine referral today   - He will f/up with endocrine in a few months, if fatigue still present, could ask Dr. Sarabia if adding cytomel would make sense   - Try to not eat or drink within 2 hours of bedtime to reduce amount of times he is getting up to use the bathroom  - F/up 3-4 months          Relevant Orders    Adult Sleep  Eval & Management  Referral    CBC with platelets    Iron and iron binding capacity    Ferritin    Comprehensive metabolic panel     Other Visit Diagnoses       Encounter for vaccination        Relevant Orders    INFLUENZA VACCINE TRIVALENT(FLUBLOK) (Completed)    COVID-19 12+ (PFIZER) (Completed)          Ordering of each unique test  Prescription drug management  I spent a total of 31 minutes on the day of the visit.   Time spent by me doing chart review, history and exam, documentation and further activities per the note     FUTURE APPOINTMENTS:       - Follow-up visit in 3-4 months       Subjective   Brandan is a 64 year old, presenting for the following health issues:  Follow Up (3 month follow up. Pt states my tiredness and weakness is still there even if I get 8 hour of sleep )        10/11/2024     8:14 AM   Additional Questions   Roomed by Dioni Almeida   Accompanied by N/A         10/11/2024   Forms   Any forms needing to be completed Yes      History of Present Illness     Reason for visit:  Routine check up   He is taking medications regularly.     Post-surgical hypothyroidism: At our last visit (7/10/24) TSH 92.10 and FT4 <0.10. Had not been taking his synthroid. He did resume 137 mcg daily. F/up labs 8/12/24 showed TSH 2.64, FT4 2.03. Dose of synthroid left the same.     Fatigue: Tells me today that started to feel a little better with adding back the synthroid. However, still some fatigue and weakness. After meals feels more tired. Sleeping 9-10 hours most nights but tells me he is waking up frequently to use the bathroom at night. Some headaches during the day and some dizziness. Exercise is walking, an hour or so. Goal is to close rings on his apple watch every day. Started work again 2 weeks ago full time as well, which may be contributing.     HLD: lipids 7/10/24 Tchol 132, , LDL 45, HDL 66    Pre-DM: A1c 5.9 (7/10/24). Metformin 500 mg daily     HTN: losartan 25 mg daily       Review of  "Systems  Constitutional, neuro, ENT, endocrine, pulmonary, cardiac, gastrointestinal, genitourinary, musculoskeletal, integument and psychiatric systems are negative, except as otherwise noted.      Objective    /82   Pulse 74   Temp 98.7  F (37.1  C) (Oral)   Resp 16   Ht 1.702 m (5' 7\")   Wt 70.3 kg (155 lb)   SpO2 98%   BMI 24.28 kg/m    Body mass index is 24.28 kg/m .  Physical Exam   GENERAL: alert and no distress  EYES: Eyes grossly normal to inspection and conjunctivae and sclerae normal  HENT: nose and mouth without ulcers or lesions  NECK: no adenopathy, no asymmetry, well healed surgical scar  RESP: lungs clear to auscultation - no rales, rhonchi or wheezes  CV: regular rate and rhythm, normal S1 S2, no S3 or S4, no murmur, click or rub, no peripheral edema  ABDOMEN: soft, nontender, no hepatosplenomegaly, no masses and bowel sounds normal  MS: no gross musculoskeletal defects noted, no edema  SKIN: no suspicious lesions or rashes on exposed skin   NEURO: Normal strength and tone, mentation intact and speech normal  PSYCH: mentation appears normal, affect normal/bright    No results found for this or any previous visit (from the past 24 hour(s)).        Signed Electronically by: Kandice Noyola MD    "

## 2024-10-11 NOTE — ASSESSMENT & PLAN NOTE
Status post thyroidectomy 1/2024. At last visit, had been out of Synthroid for 3 months and TSH was 92.  He has since resumed Synthroid 137 mcg daily and repeat labs 8/12/24 showed TSH 2.64, FT4 2.03.  - Continue synthroid 137 mcg daily  - Again provided refills at pharmacy as he has been having issues with them saying he doesn't have fills available  - F/up with endocrine as well

## 2024-10-11 NOTE — LETTER
October 11, 2024      Brandan Alejandra  [address] [place] [address]        To Whom It May Concern:    Brandan Alejandra was seen in our clinic 10/11/24.  Please excuse him from missing work today for this scheduled appointment.         Sincerely,        Kandice Noyola MD

## 2024-10-14 LAB
BURR CELLS BLD QL SMEAR: SLIGHT
DACRYOCYTES BLD QL SMEAR: SLIGHT
PATH REV: ABNORMAL
PLAT MORPH BLD: ABNORMAL
RBC MORPH BLD: ABNORMAL

## 2025-02-16 ENCOUNTER — HEALTH MAINTENANCE LETTER (OUTPATIENT)
Age: 65
End: 2025-02-16

## 2025-02-18 ENCOUNTER — OFFICE VISIT (OUTPATIENT)
Dept: SLEEP MEDICINE | Facility: CLINIC | Age: 65
End: 2025-02-18
Payer: COMMERCIAL

## 2025-02-18 DIAGNOSIS — R35.1 NOCTURIA: ICD-10-CM

## 2025-02-18 DIAGNOSIS — R40.0 DAYTIME SLEEPINESS: ICD-10-CM

## 2025-02-18 DIAGNOSIS — E66.3 OVERWEIGHT WITH BODY MASS INDEX (BMI) OF 25 TO 25.9 IN ADULT: ICD-10-CM

## 2025-02-18 DIAGNOSIS — G47.00 INSOMNIA, UNSPECIFIED TYPE: ICD-10-CM

## 2025-02-18 DIAGNOSIS — G47.30 OBSERVED SLEEP APNEA: ICD-10-CM

## 2025-02-19 ENCOUNTER — DOCUMENTATION ONLY (OUTPATIENT)
Dept: SLEEP MEDICINE | Facility: CLINIC | Age: 65
End: 2025-02-19
Payer: COMMERCIAL

## 2025-02-19 NOTE — PROGRESS NOTES
HST POST-STUDY QUESTIONNAIRE    What time did you go to bed?  20:00  How long do you think it took to fall asleep?  30 min  What time did you wake up to start the day?  05:30  Did you get up during the night at all?  yes  If you woke up, do you remember approximately what time(s)? 0100  Did you have any difficulty with the equipment?  No  Did you us any type of treatment with this study?  None  Was the head of the bed elevated? No  Did you sleep in a recliner?  No  Did you stop using CPAP at least 3 days before this test?  NA  Any other information you'd like us to know?

## 2025-02-19 NOTE — PROGRESS NOTES
This HSAT was performed using a Noxturnal T3 device which recorded snore, sound, movement activity, body position, nasal pressure, oronasal thermal airflow, pulse, oximetry and both chest and abdominal respiratory effort. HSAT data was restricted to the time patient states they were in bed.     HSAT was scored using 1B 4% hypopnea rule.     HST AHI (Non-PAT): 44  Snoring was reported as loud.  Time with SpO2 below 89% was 17.6 minutes.   Overall signal quality was good     Pt will follow up with sleep provider to determine appropriate therapy.

## 2025-02-19 NOTE — PROGRESS NOTES
Pt is completing a home sleep test. Pt was instructed on how to put on the Noxturnal T3 device and associated equipment before going to bed and given the opportunity to practice putting it on before leaving the sleep center. Pt was reminded to bring the home sleep test kit back to the center tomorrow, at the scheduled time for download and reporting. Patient was instructed to complete study using the following treatment?  None  Neck circumference: 43 CM / 17 inches.  Device number: 47

## 2025-05-06 ENCOUNTER — ORDERS ONLY (AUTO-RELEASED) (OUTPATIENT)
Dept: INTERNAL MEDICINE | Facility: CLINIC | Age: 65
End: 2025-05-06

## 2025-05-06 ENCOUNTER — OFFICE VISIT (OUTPATIENT)
Dept: INTERNAL MEDICINE | Facility: CLINIC | Age: 65
End: 2025-05-06
Payer: COMMERCIAL

## 2025-05-06 VITALS
HEART RATE: 77 BPM | DIASTOLIC BLOOD PRESSURE: 82 MMHG | WEIGHT: 168 LBS | TEMPERATURE: 97.9 F | OXYGEN SATURATION: 97 % | RESPIRATION RATE: 16 BRPM | BODY MASS INDEX: 26.37 KG/M2 | SYSTOLIC BLOOD PRESSURE: 132 MMHG | HEIGHT: 67 IN

## 2025-05-06 DIAGNOSIS — R80.9 MICROALBUMINURIA: ICD-10-CM

## 2025-05-06 DIAGNOSIS — C73 THYROID CANCER (H): ICD-10-CM

## 2025-05-06 DIAGNOSIS — Z00.00 ROUTINE GENERAL MEDICAL EXAMINATION AT A HEALTH CARE FACILITY: Primary | ICD-10-CM

## 2025-05-06 DIAGNOSIS — D56.9 THALASSEMIA, UNSPECIFIED TYPE: ICD-10-CM

## 2025-05-06 DIAGNOSIS — Z12.11 SCREEN FOR COLON CANCER: ICD-10-CM

## 2025-05-06 DIAGNOSIS — I10 ESSENTIAL HYPERTENSION: ICD-10-CM

## 2025-05-06 DIAGNOSIS — E78.5 HYPERLIPIDEMIA LDL GOAL <70: ICD-10-CM

## 2025-05-06 DIAGNOSIS — Z23 ENCOUNTER FOR VACCINATION: ICD-10-CM

## 2025-05-06 DIAGNOSIS — G47.33 OSA (OBSTRUCTIVE SLEEP APNEA): ICD-10-CM

## 2025-05-06 DIAGNOSIS — Z12.5 SCREENING FOR PROSTATE CANCER: ICD-10-CM

## 2025-05-06 DIAGNOSIS — E89.0 POSTOPERATIVE HYPOTHYROIDISM: ICD-10-CM

## 2025-05-06 DIAGNOSIS — Z87.891 PERSONAL HISTORY OF TOBACCO USE: ICD-10-CM

## 2025-05-06 DIAGNOSIS — R73.03 PREDIABETES: ICD-10-CM

## 2025-05-06 PROBLEM — E07.9 THYROID MASS: Status: RESOLVED | Noted: 2023-09-08 | Resolved: 2025-05-06

## 2025-05-06 LAB
ALBUMIN SERPL BCG-MCNC: 4.6 G/DL (ref 3.5–5.2)
ALP SERPL-CCNC: 71 U/L (ref 40–150)
ALT SERPL W P-5'-P-CCNC: 51 U/L (ref 0–70)
ANION GAP SERPL CALCULATED.3IONS-SCNC: 9 MMOL/L (ref 7–15)
AST SERPL W P-5'-P-CCNC: 41 U/L (ref 0–45)
BILIRUB SERPL-MCNC: 1 MG/DL
BUN SERPL-MCNC: 16.6 MG/DL (ref 8–23)
CALCIUM SERPL-MCNC: 9.1 MG/DL (ref 8.8–10.4)
CHLORIDE SERPL-SCNC: 103 MMOL/L (ref 98–107)
CHOLEST SERPL-MCNC: 145 MG/DL
CREAT SERPL-MCNC: 0.91 MG/DL (ref 0.67–1.17)
CREAT UR-MCNC: 61.1 MG/DL
EGFRCR SERPLBLD CKD-EPI 2021: >90 ML/MIN/1.73M2
ERYTHROCYTE [DISTWIDTH] IN BLOOD BY AUTOMATED COUNT: 19.6 % (ref 10–15)
EST. AVERAGE GLUCOSE BLD GHB EST-MCNC: 114 MG/DL
FASTING STATUS PATIENT QL REPORTED: ABNORMAL
FASTING STATUS PATIENT QL REPORTED: NORMAL
GLUCOSE SERPL-MCNC: 86 MG/DL (ref 70–99)
HBA1C MFR BLD: 5.6 % (ref 0–5.6)
HCO3 SERPL-SCNC: 27 MMOL/L (ref 22–29)
HCT VFR BLD AUTO: 41.2 % (ref 40–53)
HDLC SERPL-MCNC: 50 MG/DL
HGB BLD-MCNC: 14.1 G/DL (ref 13.3–17.7)
LDLC SERPL CALC-MCNC: 59 MG/DL
MCH RBC QN AUTO: 20.6 PG (ref 26.5–33)
MCHC RBC AUTO-ENTMCNC: 34.2 G/DL (ref 31.5–36.5)
MCV RBC AUTO: 60 FL (ref 78–100)
MICROALBUMIN UR-MCNC: 30.4 MG/L
MICROALBUMIN/CREAT UR: 49.75 MG/G CR (ref 0–17)
NONHDLC SERPL-MCNC: 95 MG/DL
PLAT MORPH BLD: ABNORMAL
PLATELET # BLD AUTO: 209 10E3/UL (ref 150–450)
POTASSIUM SERPL-SCNC: 3.6 MMOL/L (ref 3.4–5.3)
PROT SERPL-MCNC: 7.5 G/DL (ref 6.4–8.3)
PSA SERPL DL<=0.01 NG/ML-MCNC: 0.86 NG/ML (ref 0–4.5)
RBC # BLD AUTO: 6.84 10E6/UL (ref 4.4–5.9)
RBC MORPH BLD: ABNORMAL
SODIUM SERPL-SCNC: 139 MMOL/L (ref 135–145)
TARGETS BLD QL SMEAR: SLIGHT
TRIGL SERPL-MCNC: 178 MG/DL
WBC # BLD AUTO: 6 10E3/UL (ref 4–11)

## 2025-05-06 RX ORDER — LOSARTAN POTASSIUM 25 MG/1
25 TABLET ORAL DAILY
Qty: 90 TABLET | Refills: 3 | Status: SHIPPED | OUTPATIENT
Start: 2025-05-06

## 2025-05-06 RX ORDER — ATORVASTATIN CALCIUM 40 MG/1
40 TABLET, FILM COATED ORAL EVERY EVENING
Qty: 90 TABLET | Refills: 3 | Status: SHIPPED | OUTPATIENT
Start: 2025-05-06

## 2025-05-06 SDOH — HEALTH STABILITY: PHYSICAL HEALTH: ON AVERAGE, HOW MANY MINUTES DO YOU ENGAGE IN EXERCISE AT THIS LEVEL?: 60 MIN

## 2025-05-06 SDOH — HEALTH STABILITY: PHYSICAL HEALTH: ON AVERAGE, HOW MANY DAYS PER WEEK DO YOU ENGAGE IN MODERATE TO STRENUOUS EXERCISE (LIKE A BRISK WALK)?: 7 DAYS

## 2025-05-06 NOTE — ASSESSMENT & PLAN NOTE
lipids 7/10/24 Tchol 132, , LDL 45, HDL 66  - Continue atorvastatin 40 mg daily  - Repeat lipids today, goal LDL

## 2025-05-06 NOTE — ASSESSMENT & PLAN NOTE
Well-controlled with A1c of 5.9 (7/10/24) on metformin 500 mg daily.  - Repeat A1c today   - Continue current lifestyle management

## 2025-05-06 NOTE — ASSESSMENT & PLAN NOTE
We discussed healthy lifestyle, nutrition, cardiovascular risk reduction, self care, safety, sunscreen, and timing of cancer screening.  Health maintenance screening and immunizations reviewed with the patient.    - PSA today  - Screening labs today   - Lung cancer screening CT due and ordered  - Due for colon cancer screening, has a hard time lining up rides, will do cologuard  - Tdap and shingles vaccines today

## 2025-05-06 NOTE — PROGRESS NOTES
Preventive Care Visit  [facility] Renetta Noyola MD, Internal Medicine  May 6, 2025      Assessment & Plan   Problem List Items Addressed This Visit          Respiratory    CALIXTO (obstructive sleep apnea)     New dx on HST 2/2025. Severe CALIXTO with AHI 44. CPAP recommended.   - Has sleep f/up this week Friday to discuss CPAP            Endocrine    Hyperlipidemia LDL goal <70     lipids 7/10/24 Tchol 132, , LDL 45, HDL 66  - Continue atorvastatin 40 mg daily  - Repeat lipids today, goal LDL          Relevant Medications    atorvastatin (LIPITOR) 40 MG tablet    Other Relevant Orders    Lipid panel reflex to direct LDL Fasting    Prediabetes     Well-controlled with A1c of 5.9 (7/10/24) on metformin 500 mg daily.  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         Postoperative hypothyroidism     S/p thyroidectomy 1/2024. After resuming synthroid, TSH was in normal range 8/2024. Ongoing fatigue (likely in s/o untreated severe CALIXTO) and has gained some weight back.   - Repeat TSH today, adjust synthroid as needed (per Dr. Lan)            Circulatory    Essential hypertension     Well-controlled on current regimen.  - Continue losartan 25 mg daily          Relevant Medications    losartan (COZAAR) 25 MG tablet       Hematologic    Thalassemia     No prior electrophoresis.    - Repeat CBC today  - If he develops anemia, would add on thalasemia            Other    Microalbuminuria     Slight on labs 9/2023.  -Continue losartan 25 mg daily  -Repeat microalbumin today         Relevant Orders    Albumin Random Urine Quantitative with Creat Ratio    Routine general medical examination at a health care facility - Primary     We discussed healthy lifestyle,  "nutrition, cardiovascular risk reduction, self care, safety, sunscreen, and timing of cancer screening.  Health maintenance screening and immunizations reviewed with the patient.    - PSA today  - Screening labs today   - Lung cancer screening CT due and ordered  - Due for colon cancer screening, has a hard time lining up rides, will do cologuard  - Tdap and shingles vaccines today          Relevant Orders    Comprehensive metabolic panel    CBC with platelets     Other Visit Diagnoses       Screen for colon cancer        Relevant Orders    COLOGUARD(EXACT SCIENCES)    Encounter for vaccination        Relevant Orders    TDAP 10-64Y (ADACEL,BOOSTRIX) (Completed)    ZOSTER RECOMBINANT ADJUVANTED (SHINGRIX) (Completed)    Personal history of tobacco use        Relevant Orders    Prof fee: Shared Decision Making for Lung Cancer Screening (Completed)    CT Chest Lung Cancer Scrn Low Dose wo    Screening for prostate cancer        Relevant Orders    PSA, screen             Patient has been advised of split billing requirements and indicates understanding: Yes       BMI  Estimated body mass index is 26.3 kg/m  as calculated from the following:    Height as of this encounter: 1.702 m (5' 7.01\").    Weight as of this encounter: 76.2 kg (168 lb).   Weight management plan: Discussed healthy diet and exercise guidelines    Counseling  Appropriate preventive services were addressed with this patient via screening, questionnaire, or discussion as appropriate for fall prevention, nutrition, physical activity, Tobacco-use cessation, social engagement, weight loss and cognition.  Checklist reviewing preventive services available has been given to the patient.  Reviewed patient's diet, addressing concerns and/or questions.   The patient was instructed to see the dentist every 6 months.     The longitudinal plan of care for the diagnosis(es)/condition(s) as documented were addressed during this visit. Due to the added complexity in " care, I will continue to support Brandan in the subsequent management and with ongoing continuity of care.    Follow-up    Follow-up Visit   Expected date:  May 06, 2026 (Approximate)      Follow Up Appointment Details:     Follow-up with whom?: PCP    Follow-Up for what?: Adult Preventive    How?: In Person                 Subjective   Brandan is a 64 year old, presenting for the following:  Follow Up (Follow up on general health ) and Diabetes        5/6/2025     9:59 AM   Additional Questions   Roomed by Dioni Almeida   Accompanied by N/A          HPI    History of Present Illness     Brandan is here for general f/up and physical.      CALIXTO: New dx on HST 2/2025. Severe CALIXTO  with AHI 44. CPAP recommended. F/up scheduled 5/9/25.      Pre-DM: metformin 500 mg daily      Hypothyroidism: Last TSH 8/2024 2.64 after resuming synthroid 137 mcg daily. Repeat labs due (per Dr. Lan due in Feb)     HTN: losartan 25 mg daily. BP today 132/82     HLD: atorvastatin 40 mg daily      HCM: LungRADS 2S 9/2023.      Quit smoking 2 years ago. Previously about ~1/2 pack a day for 40+ years.     Still feeling fatigued. Has gained a few pounds since last visit.     Wt Readings from Last 4 Encounters:   05/06/25 76.2 kg (168 lb)   11/22/24 73.1 kg (161 lb 3.2 oz)   10/11/24 70.3 kg (155 lb)   07/10/24 74.8 kg (165 lb)          Walks 30 minutes almost every day.     Advance Care Planning    Discussed advance care planning with patient; informed AVS has link to [employer] [employer].        5/6/2025   General Health   How would you rate your overall physical health? (!) FAIR   Feel stress (tense, anxious, or unable to sleep) Only a little   (!) STRESS CONCERN      5/6/2025   Nutrition   Three or more servings of calcium each day? Yes   Diet: Low salt   How many servings of fruit and vegetables per day? (!) 2-3   How many sweetened beverages each day? (!) 2         5/6/2025   Exercise   Days per week of moderate/strenous exercise 7 days   Average  minutes spent exercising at this level 60 min         5/6/2025   Social Factors   Worry food won't last until get money to buy more No   Food not last or not have enough money for food? No   Do you have housing? (Housing is defined as stable permanent housing and does not include staying outside in a car, in a tent, in an abandoned building, in an overnight shelter, or couch-surfing.) Yes   Are you worried about losing your housing? No   Lack of transportation? No   Unable to get utilities (heat,electricity)? No         5/6/2025   Fall Risk   Fallen 2 or more times in the past year? No    Trouble with walking or balance? No        Proxy-reported          5/6/2025   Dental   Dentist two times every year? (!) NO         Today's PHQ-2 Score:       5/6/2025     9:51 AM   PHQ-2 ( 1999 [employer])   Q1: Little interest or pleasure in doing things 1   Q2: Feeling down, depressed or hopeless 1   PHQ-2 Score 2    Q1: Little interest or pleasure in doing things Several days   Q2: Feeling down, depressed or hopeless Several days   PHQ-2 Score 2       Patient-reported           5/6/2025   Substance Use   Alcohol more than 3/day or more than 7/wk No   Do you use any other substances recreationally? No     Social History     Tobacco Use    Smoking status: Former     Current packs/day: 0.00     Types: Cigarettes     Quit date: 2022     Years since quitting: 3.3    Smokeless tobacco: Never   Vaping Use    Vaping status: Never Used   Substance Use Topics    Alcohol use: Yes     Comment: rarely    Drug use: Not Currently           5/6/2025   STI Screening   New sexual partner(s) since last STI/HIV test? No   Last PSA:   Prostate Specific Antigen Screen   Date Value Ref Range Status   01/09/2024 0.89 0.00 - 4.50 ng/mL Final     ASCVD Risk   The 10-year ASCVD risk score (Yosi MAHER, et al., 2019) is: 7.4%    Values used to calculate the score:      Age: 64 years      Sex: Male      Is Non- : No       "Diabetic: No      Tobacco smoker: No      Systolic Blood Pressure: 132 mmHg      Is BP treated: Yes      HDL Cholesterol: 66 mg/dL      Total Cholesterol: 132 mg/dL         Reviewed and updated as needed this visit by Provider   Tobacco  Allergies  Meds  Problems  Med Hx  Surg Hx  Fam Hx            Past Medical History:   Diagnosis Date    Headache     HTN (hypertension)     Insomnia     Microalbuminuria     Postoperative hypothyroidism 01/09/2024    Prediabetes 2021    Thalassemia     Thyroid mass 09/2023    Hurthle cell adenoma on surgical path     Past Surgical History:   Procedure Laterality Date    CHOLECYSTECTOMY, LAPOROSCOPIC  07/16/2020    INCISION AND DRAINAGE, ABSCESS, SIMPLE Right     RELEASE TRIGGER FINGER Right 12/15/2023    Procedure: RELEASE, RIGHT MIDDLE TRIGGER FINGER;  Surgeon: Umang Oneal MD;  Location: [facility] OR    REMOVE FOREIGN BODY FINGER Right 12/15/2023    Procedure: REMOVAL, FOREIGN BODY, RIGHT MIDDLE FINGER;  Surgeon: Umang Oneal MD;  Location: [facility] OR    THYROIDECTOMY Bilateral 1/9/2024    Procedure: total thyroidectomy and reimplantation of parathyroid;  Surgeon: Chris Mendez MD;  Location: [facility] OR         Review of Systems  Constitutional, neuro, ENT, endocrine, pulmonary, cardiac, gastrointestinal, genitourinary, musculoskeletal, integument and psychiatric systems are negative, except as otherwise noted.     Objective    Exam  /82   Pulse 77   Temp 97.9  F (36.6  C) (Oral)   Resp 16   Ht 1.702 m (5' 7.01\")   Wt 76.2 kg (168 lb)   SpO2 97%   BMI 26.30 kg/m     Estimated body mass index is 26.3 kg/m  as calculated from the following:    Height as of this encounter: 1.702 m (5' 7.01\").    Weight as of this encounter: 76.2 kg (168 lb).    Physical Exam  GENERAL: alert and no distress  EYES: Eyes grossly normal to inspection and conjunctivae and sclerae normal  HENT: ear canals and TM's normal, nose and mouth without ulcers or lesions  NECK: no adenopathy, no " asymmetry, or masses. Well healed scar.   RESP: lungs clear to auscultation - no rales, rhonchi or wheezes  CV: regular rate and rhythm, normal S1 S2, no S3 or S4, no murmur, click or rub, no peripheral edema  ABDOMEN: soft, nontender, no hepatosplenomegaly, no masses and bowel sounds normal  MS: no gross musculoskeletal defects noted, no edema  SKIN: no suspicious lesions or rashes on exposed skin   NEURO: Normal strength and tone, mentation intact and speech normal  PSYCH: mentation appears normal, affect normal/bright        Signed Electronically by: Kandice Noyola MD

## 2025-05-06 NOTE — ASSESSMENT & PLAN NOTE
New dx on HST 2/2025. Severe CALIXTO with AHI 44. CPAP recommended.   - Has sleep f/up this week Friday to discuss CPAP

## 2025-05-06 NOTE — PATIENT INSTRUCTIONS
Patient Education   Preventive Care Advice   This is general advice given by our system to help you stay healthy. However, your care team may have specific advice just for you. Please talk to your care team about your preventive care needs.  Nutrition  Eat 5 or more servings of fruits and vegetables each day.  Try wheat bread, brown rice and whole grain pasta (instead of white bread, rice, and pasta).  Get enough calcium and vitamin D. Check the label on foods and aim for 100% of the RDA (recommended daily allowance).  Lifestyle  Exercise at least 150 minutes each week  (30 minutes a day, 5 days a week).  Do muscle strengthening activities 2 days a week. These help control your weight and prevent disease.  No smoking.  Wear sunscreen to prevent skin cancer.  Have a dental exam and cleaning every 6 months.  Yearly exams  See your health care team every year to talk about:  Any changes in your health.  Any medicines your care team has prescribed.  Preventive care, family planning, and ways to prevent chronic diseases.  Shots (vaccines)   HPV shots (up to age 26), if you've never had them before.  Hepatitis B shots (up to age 59), if you've never had them before.  COVID-19 shot: Get this shot when it's due.  Flu shot: Get a flu shot every year.  Tetanus shot: Get a tetanus shot every 10 years.  Pneumococcal, hepatitis A, and RSV shots: Ask your care team if you need these based on your risk.  Shingles shot (for age 50 and up)  General health tests  Diabetes screening:  Starting at age 35, Get screened for diabetes at least every 3 years.  If you are younger than age 35, ask your care team if you should be screened for diabetes.  Cholesterol test: At age 39, start having a cholesterol test every 5 years, or more often if advised.  Bone density scan (DEXA): At age 50, ask your care team if you should have this scan for osteoporosis (brittle bones).  Hepatitis C: Get tested at least once in your life.  STIs (sexually  transmitted infections)  Before age 24: Ask your care team if you should be screened for STIs.  After age 24: Get screened for STIs if you're at risk. You are at risk for STIs (including HIV) if:  You are sexually active with more than one person.  You don't use condoms every time.  You or a partner was diagnosed with a sexually transmitted infection.  If you are at risk for HIV, ask about PrEP medicine to prevent HIV.  Get tested for HIV at least once in your life, whether you are at risk for HIV or not.  Cancer screening tests  Cervical cancer screening: If you have a cervix, begin getting regular cervical cancer screening tests starting at age 21.  Breast cancer scan (mammogram): If you've ever had breasts, begin having regular mammograms starting at age 40. This is a scan to check for breast cancer.  Colon cancer screening: It is important to start screening for colon cancer at age 45.  Have a colonoscopy test every 10 years (or more often if you're at risk) Or, ask your provider about stool tests like a FIT test every year or Cologuard test every 3 years.  To learn more about your testing options, visit:   .  For help making a decision, visit:   https://bit.ly/bd64746.  Prostate cancer screening test: If you have a prostate, ask your care team if a prostate cancer screening test (PSA) at age 55 is right for you.  Lung cancer screening: If you are a current or former smoker ages 50 to 80, ask your care team if ongoing lung cancer screenings are right for you.  For informational purposes only. Not to replace the advice of your health care provider. Copyright   2023 [facility]. All rights reserved. Clinically reviewed by the [facility] Transitions Program. [employer] 833158 - REV 01/24.     Lung Cancer Screening   Frequently Asked Questions  If you are at high-risk for lung cancer, getting screened with low-dose computed tomography (LDCT) every year can help save your life. This handout offers  answers to some of the most common questions about lung cancer screening. If you have other questions, please call [phone] ([phone]).     What is it?  Lung cancer screening uses special X-ray technology to create an image of your lung tissue. The exam is quick and easy and takes less than 10 seconds. We don t give you any medicine or use any needles. You can eat before and after the exam. You don t need to change your clothes as long as the clothing on your chest doesn t contain metal. But, you do need to be able to hold your breath for at least 6 seconds during the exam.    What is the goal of lung cancer screening?  The goal of lung cancer screening is to save lives. Many times, lung cancer is not found until a person starts having physical symptoms. Lung cancer screening can help detect lung cancer in the earliest stages when it may be easier to treat.    Who should be screened for lung cancer?  We suggest lung cancer screening for anyone who is at high-risk for lung cancer. You are in the high-risk group if you:      are between the ages of 55 and 79, and    have smoked at least 1 pack of cigarettes a day for 20 or more years, and    still smoke or have quit within the past 15 years.    However, if you have a new cough or shortness of breath, you should talk to your doctor before being screened.    Why does it matter if I have symptoms?  Certain symptoms can be a sign that you have a condition in your lungs that should be checked and treated by your doctor. These symptoms include fever, chest pain, a new or changing cough, shortness of breath that you have never felt before, coughing up blood or unexplained weight loss. Having any of these symptoms can greatly affect the results of lung cancer screening.       Should all smokers get an LDCT lung cancer screening exam?  It depends. Lung cancer screening is for a very specific group of men and women who have a history of heavy smoking over a long  period of time (see  Who should be screened for lung cancer  above).  I am in the high-risk group, but have been diagnosed with cancer in the past. Is LDCT lung cancer screening right for me?  In some cases, you should not have LDCT lung screening, such as when your doctor is already following your cancer with CT scan studies. Your doctor will help you decide if LDCT lung screening is right for you.  Do I need to have a screening exam every year?  Yes. If you are in the high-risk group described earlier, you should get an LDCT lung cancer screening exam every year until you are 79, or are no longer willing or able to undergo screening and possible procedures to diagnose and treat lung cancer.  How effective is LDCT at preventing death from lung cancer?  Studies have shown that LDCT lung cancer screening can lower the risk of death from lung cancer by 20 percent in people who are at high-risk.  What are the risks?  There are some risks and limitations of LDCT lung cancer screening. We want to make sure you understand the risks and benefits, so please let us know if you have any questions. Your doctor may want to talk with you more about these risks.    Radiation exposure: As with any exam that uses radiation, there is a very small increased risk of cancer. The amount of radiation in LDCT is small--about the same amount a person would get from a mammogram. Your doctor orders the exam when he or she feels the potential benefits outweigh the risks.    False negatives: No test is perfect, including LDCT. It is possible that you may have a medical condition, including lung cancer, that is not found during your exam. This is called a false negative result.    False positives and more testing: LDCT very often finds something in the lung that could be cancer, but in fact is not. This is called a false positive result. False positive tests often cause anxiety. To make sure these findings are not cancer, you may need to have  more tests. These tests will be done only if you give us permission. Sometimes patients need a treatment that can have side effects, such as a biopsy. For more information on false positives, see  What can I expect from the results?     Findings not related to lung cancer: Your LDCT exam also takes pictures of areas of your body next to your lungs. In a very small number of cases, the CT scan will show an abnormal finding in one of these areas, such as your kidneys, adrenal glands, liver or thyroid. This finding may not be serious, but you may need more tests. Your doctor can help you decide what other tests you may need, if any.  What can I expect from the results?  About 1 out of 4 LDCT exams will find something that may need more tests. Most of the time, these findings are lung nodules. Lung nodules are very small collections of tissue in the lung. These nodules are very common, and the vast majority--more than 97 percent--are not cancer (benign). Most are normal lymph nodes or small areas of scarring from past infections.  But, if a small lung nodule is found to be cancer, the cancer can be cured more than 90 percent of the time. To know if the nodule is cancer, we may need to get more images before your next yearly screening exam. If the nodule has suspicious features (for example, it is large, has an odd shape or grows over time), we will refer you to a specialist for further testing.  Will my doctor also get the results?  Yes. Your doctor will get a copy of your results.  Is it okay to keep smoking now that there s a cancer screening exam?  No. Tobacco is one of the strongest cancer-causing agents. It causes not only lung cancer, but other cancers and cardiovascular (heart) diseases as well. The damage caused by smoking builds over time. This means that the longer you smoke, the higher your risk of disease. While it is never too late to quit, the sooner you quit, the better.  Where can I find help to quit  smoking?  The best way to prevent lung cancer is to stop smoking. If you have already quit smoking, congratulations and keep it up! For help on quitting smoking, please call [employer] at 9-716-QUITNOW (1-231.814.1218) or the American [employer] at 1-413.993.8239 to find local resources near you.  One-on-one health coaching:  If you d prefer to work individually with a health care provider on tobacco cessation, we offer:      Medication Therapy Management:  Our specially trained pharmacists work closely with you and your doctor to help you quit smoking.  Call [phone] or [phone] (toll free).

## 2025-05-06 NOTE — ASSESSMENT & PLAN NOTE
S/p thyroidectomy 1/2024. After resuming synthroid, TSH was in normal range 8/2024. Ongoing fatigue (likely in s/o untreated severe CALIXTO) and has gained some weight back.   - Repeat TSH today, adjust synthroid as needed (per Dr. Lan)

## 2025-05-06 NOTE — LETTER
2025    Brandan Ny   1960        To Whom it May Concern;    Please excuse Brandan Alejandra from work for a healthcare visit on May 6, 2025.    Sincerely,        Kandice Noyola MD

## 2025-05-07 ENCOUNTER — RESULTS FOLLOW-UP (OUTPATIENT)
Dept: INTERNAL MEDICINE | Facility: CLINIC | Age: 65
End: 2025-05-07

## 2025-05-07 DIAGNOSIS — E89.0 POSTOPERATIVE HYPOTHYROIDISM: ICD-10-CM

## 2025-05-07 DIAGNOSIS — C73 THYROID CANCER (H): Primary | ICD-10-CM

## 2025-05-07 LAB
T4 FREE SERPL-MCNC: 1.52 NG/DL (ref 0.9–1.7)
TSH SERPL DL<=0.005 MIU/L-ACNC: 16.1 UIU/ML (ref 0.3–4.2)

## 2025-05-13 ENCOUNTER — HOSPITAL ENCOUNTER (OUTPATIENT)
Dept: CT IMAGING | Facility: HOSPITAL | Age: 65
Discharge: HOME | End: 2025-05-13
Attending: INTERNAL MEDICINE
Payer: COMMERCIAL

## 2025-05-13 DIAGNOSIS — Z87.891 PERSONAL HISTORY OF TOBACCO USE: ICD-10-CM

## 2025-05-13 PROCEDURE — 71271 CT THORAX LUNG CANCER SCR C-: CPT

## 2025-05-14 ENCOUNTER — RESULTS FOLLOW-UP (OUTPATIENT)
Dept: INTERNAL MEDICINE | Facility: CLINIC | Age: 65
End: 2025-05-14

## 2025-05-17 LAB — NONINV COLON CA DNA+OCC BLD SCRN STL QL: NEGATIVE

## 2025-05-21 ENCOUNTER — DOCUMENTATION ONLY (OUTPATIENT)
Dept: SLEEP MEDICINE | Facility: CLINIC | Age: 65
End: 2025-05-21
Payer: COMMERCIAL

## 2025-05-21 DIAGNOSIS — G47.33 OBSTRUCTIVE SLEEP APNEA (ADULT) (PEDIATRIC): Primary | ICD-10-CM

## 2025-05-21 NOTE — PROGRESS NOTES
Patient was offered choice of vendor and chose [facility].  Patient Brandan Alejandra was set up at [facility]  on May 21, 2025. Patient received a Resmed Airsense 10 Pressures were set at 5-15 cm H2O.   Patient s ramp is 5 cm H2O for Auto and FLEX/EPR is EPR, 3.  Patient received a Resmed Mask name: AirFit F40 Full Face mask size Small, Wide, heated tubing and heated humidifier.  Patient has the following compliance requirements: using and visit requirements  Patient has a follow up on 8/22/25 with Zuri Hills CNP.    EDVIN MELTON

## 2025-05-27 ENCOUNTER — DOCUMENTATION ONLY (OUTPATIENT)
Dept: SLEEP MEDICINE | Facility: CLINIC | Age: 65
End: 2025-05-27
Payer: COMMERCIAL

## 2025-05-27 NOTE — PROGRESS NOTES
3 day Sleep therapy management telephone visit    Diagnostic AHI: HST: 44        Confirmed with patient at time of call- Yes Patient is still interested in STM service       Subjective measures:  Patient stated things going pretty good with his CPAP.  He stated his headaches are gone but he still feels a little groggy.          Objective data     Order Settings for PAP  CPAP min     CPAP max     CPAP fixed         Device settings from machine CPAP min 5.0     CPAP max 15.0      CPAP fixed      EPR Setting THREE    RESMED soft response  ON     Assessment: Nightly usage over four hours      Patient has the following upcoming sleep appts:  Future Sleep Appointments         Provider Department    8/22/2025 8:30 AM (Arrive by 8:15 AM) Zuri Hills APRN [facility]            Replacement device: No  STM ordered by provider: Yes     Total time spent on accessing and  interpreting remote patient PAP therapy data  10 minutes    Total time spent counseling, coaching  and reviewing PAP therapy data with patient  5 minutes    [id number] no

## 2025-07-24 ENCOUNTER — TELEPHONE (OUTPATIENT)
Dept: INTERNAL MEDICINE | Facility: CLINIC | Age: 65
End: 2025-07-24
Payer: COMMERCIAL

## 2025-07-31 ENCOUNTER — VIRTUAL VISIT (OUTPATIENT)
Dept: SLEEP MEDICINE | Facility: CLINIC | Age: 65
End: 2025-07-31
Payer: COMMERCIAL

## 2025-07-31 VITALS — WEIGHT: 168 LBS | HEIGHT: 67 IN | BODY MASS INDEX: 26.37 KG/M2

## 2025-07-31 DIAGNOSIS — G47.33 OSA (OBSTRUCTIVE SLEEP APNEA): Primary | ICD-10-CM

## 2025-07-31 ASSESSMENT — SLEEP AND FATIGUE QUESTIONNAIRES
HOW LIKELY ARE YOU TO NOD OFF OR FALL ASLEEP WHILE LYING DOWN TO REST IN THE AFTERNOON WHEN CIRCUMSTANCES PERMIT: HIGH CHANCE OF DOZING
HOW LIKELY ARE YOU TO NOD OFF OR FALL ASLEEP WHILE SITTING QUIETLY AFTER LUNCH WITHOUT ALCOHOL: HIGH CHANCE OF DOZING
HOW LIKELY ARE YOU TO NOD OFF OR FALL ASLEEP WHEN YOU ARE A PASSENGER IN A CAR FOR AN HOUR WITHOUT A BREAK: WOULD NEVER DOZE
HOW LIKELY ARE YOU TO NOD OFF OR FALL ASLEEP WHILE SITTING INACTIVE IN A PUBLIC PLACE: MODERATE CHANCE OF DOZING
HOW LIKELY ARE YOU TO NOD OFF OR FALL ASLEEP IN A CAR, WHILE STOPPED FOR A FEW MINUTES IN TRAFFIC: WOULD NEVER DOZE
HOW LIKELY ARE YOU TO NOD OFF OR FALL ASLEEP WHILE SITTING AND READING: MODERATE CHANCE OF DOZING
HOW LIKELY ARE YOU TO NOD OFF OR FALL ASLEEP WHILE SITTING AND TALKING TO SOMEONE: WOULD NEVER DOZE
HOW LIKELY ARE YOU TO NOD OFF OR FALL ASLEEP WHILE WATCHING TV: HIGH CHANCE OF DOZING

## 2025-07-31 ASSESSMENT — PAIN SCALES - GENERAL: PAINLEVEL_OUTOF10: NO PAIN (0)

## 2025-07-31 NOTE — NURSING NOTE
Current patient location: [address] [place] [address]    Is the patient currently in the state of [place]? YES    Visit mode: VIDEO    If the visit is dropped, the patient can be reconnected by:VIDEO VISIT: Text to cell phone:   Telephone Information:   Mobile [phone]       Will anyone else be joining the visit? NO  (If patient encounters technical issues they should call [phone]    Are changes needed to the allergy or medication list? No    Are refills needed on medications prescribed by this physician? NO    Rooming Documentation:  Questionnaire(s) completed    Reason for visit: RECHECK    Mona BOLANDF

## 2025-07-31 NOTE — PROGRESS NOTES
Virtual Visit Details  Type of service: Video Visit   Start time: 9:04 AM  End time: 9:17 AM  Originating Location (pt. Location): Home  Distant Location (provider location): Off-site  Platform used for Video Visit: [facility]    Sleep Apnea - Follow-up Visit:    Impression/Plan:  (G47.33) CALIXTO (obstructive sleep apnea) (primary encounter diagnosis)  Comment: Brandan Alejandra presents for follow-up of his severe sleep apnea, managed with CPAP. He is here to document compliance with his machine received on 05/21/2025. He is doing well with CPAP. He has more energy with CPAP use. He has met compliance. He used his machine 24/30 days and 80% of days > 4 hours of use. He is tolerating his CPAP pressures. His download shows his apnea is well controlled with a residual AHI of 2.8 events per hour. He sometimes gets moisture in his CPAP mask.    Plan: Comprehensive DME  Continue auto-PAP 5-15 cmH2O. A prescription was written for new supplies. We reviewed recommendations for cleaning and replacing supplies. I slightly decreased his humidity level to 3 to reduce condensation in his mask.      Brandan Alejandra will follow up in about 1 year(s).     Total time spent reviewing medical records, history and physical examination, review of previous testing and interpretation as well as documentation on this date: 24 minutes     CC: Kandice Noyola MD     History of Present Illness:  Chief Complaint   Patient presents with    RECHECK     Brandan Alejandra presents for follow-up of his severe sleep apnea, managed with CPAP. He is here to document compliance with his machine received on 05/21/2025. He is doing well with CPAP.     He feels more rested with CPAP use. He has more energy.     If he has nasal congestion, he will take a night off of CPAP.     He is sometimes getting moisture in his mask.     HST on 02/18/2025 at 161 lbs.- AHI 44 events per hour, 7 per hour left side. Sleep associated hypoxemia was present with 17.6  minutes spent less than or equal to 89%.    DME: [facility]    Do you use a CPAP Machine at home: Yes   Overall, on a scale of 0-10 how would you rate your CPAP (0 poor, 10 great):      What type of mask do you use: full face mask   Is your mask comfortable: Yes   If not, why:      Is your mask leaking: Yes, when he repositions at night.   If yes, where do you feel it: sides   How many night per week does the mask leak (0-7):      Do you notice snoring with mask on: Not sure  Do you notice gasping arousals with mask on: No  Are you having significant oral or nasal dryness: No   Is the pressure setting comfortable: Yes   If not, why:      What is your typical bedtime: after dinner around 8 PM sometimes later and sometimes earlier  How long does it take you to go to sleep on PAP therapy: few minutes   What time do you typically get out of bed for the day: 4 AM   How many hours on average per night are you using PAP therapy: 7-8 hours   How many hours are you sleeping per night: 7-8 hours   Do you feel well rested in the morning: Yes     He wakes once per night.     He is now taking his levothyroxine regularly. Last TSH 16.10 on 5/6/2025.     ResMed AirSense 10  Auto-PAP 5.0 - 15.0 cmH2O 30 day usage data: 06/25/2025-07/24/2025    80% of days with > 4 hours of use. 6/30 days with no use.   Average use 7 hours 40 minutes per day.   95%ile Leak 5.7 L/min.   CPAP 95% pressure 13.6 cm.   AHI 2.8 events per hour.      EPWORTH SLEEPINESS SCALE         7/31/2025     8:48 AM    [place] Sleepiness Scale ( FRED Brito  [id number]<br>ESS - [place]/English - Final version - 21 Nov 07 - [facility] [place].)   Sitting and reading Moderate chance of dozing   Watching TV High chance of dozing   Sitting, inactive in a public place (e.g. a theatre or a meeting) Moderate chance of dozing   As a passenger in a car for an hour without a break Would never doze   Lying down to rest in the afternoon when circumstances permit High chance of dozing    Sitting and talking to someone Would never doze   Sitting quietly after a lunch without alcohol High chance of dozing   In a car, while stopped for a few minutes in traffic Would never doze   [place] Score (MC) 13   [place] Score (Sleep) 13        Proxy-reported       INSOMNIA SEVERITY INDEX (OKSANA)          7/31/2025     8:51 AM   Insomnia Severity Index (OKSANA)   Difficulty falling asleep 1    Difficulty staying asleep 0    Problems waking up too early 0    How SATISFIED/DISSATISFIED are you with your CURRENT sleep pattern? 1    How NOTICEABLE to others do you think your sleep problem is in terms of impairing the quality of your life? 0    How WORRIED/DISTRESSED are you about your current sleep problem? 0    To what extent do you consider your sleep problem to INTERFERE with your daily functioning (e.g. daytime fatigue, mood, ability to function at work/daily chores, concentration, memory, mood, etc.) CURRENTLY? 0    OKSANA Total Score 2        Proxy-reported       Guidelines for Scoring/Interpretation:  Total score categories:  0-7 = No clinically significant insomnia   8-14 = Subthreshold insomnia   15-21 = Clinical insomnia (moderate severity)  22-28 = Clinical insomnia (severe)  Used via courtesy of www.[employer].[place].gov with permission from Harry Perez PhD., [facility]      Past medical/surgical history, family history, social history, medications and allergies were reviewed.        Problem List:  Patient Active Problem List    Diagnosis Date Noted    CALIXTO (obstructive sleep apnea) 10/11/2024     Priority: Medium     New dx on HST 2/2025. Severe CALIXTO with AHI 44.       Postoperative hypothyroidism 01/23/2024     Priority: Medium     Status post thyroidectomy 1/2024.       Low serum parathyroid hormone (PTH) 01/23/2024     Priority: Medium    Routine general medical examination at a health care facility 01/10/2024     Priority: Medium    Trigger finger, left index finger 12/18/2023     Priority: Medium     Microcytic red blood cells 12/12/2023     Priority: Medium    Thyroid cancer (H) 12/12/2023     Priority: Medium    Foreign body (FB) in soft tissue 10/17/2023     Priority: Medium     Last Assessment & Plan: Formatting of this note might be different from the original. Filled out short term disability for them, he has tried to continue using his hands for repetitive tasks at home but he's unable to. Recommended no repetitive movements with hands, can see if from a fatigue standpoint how he does and revisit it. Ultimately will benefit from thyroidectomy. He's had a lot of news and they are concerned about his cancer diagnosis that we still don't fully know the extent, and I do wonder if depression is a part of this. Having a language barrier also makes this more difficult. 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 Labs pending, chest x-ray pending, US of new neck mass pending Unclear etiology on exam Treatment plan based on results, could consider a diuretic but need to determine the cause of the swelling      Memory change 09/30/2022     Priority: Medium     Last Assessment & Plan: Formatting of this note might be different from the original. Discussed neurology referral, deferred for now Recommend working up to a daily 20 min walk      Microalbuminuria 06/21/2021     Priority: Medium     Formatting of this note might be different from the original. Impression - 21Jun2021: Start losartan 25 mg once daily Last Assessment & Plan: Formatting of this note might be different from the original.    "Recheck urine test.    Impression - [id number]: Start losartan 25 mg once daily      Prediabetes 06/21/2021     Priority: Medium     Last Assessment & Plan: Formatting of this note might be different from the original.   Due to the thalassemia the A1C may not be accurate and so the numbers may be an underestimate of the true average blood sugars.   Would recommend doing a CGM today to assess true blood sugars , especially given that you already have microalbuminuria.      Hyperlipidemia LDL goal <70 06/11/2021     Priority: Medium     Formatting of this note might be different from the original. Impression - [id number]: LDL cholesterol is elevated and has a calculated AHA CV risk of 13.4% - start atorvastatin 40 mg at night with goal LDL <100. Recheck cholesterol with next lab work Last Assessment & Plan: Formatting of this note might be different from the original. Annual labs today with hand swelling work up      Insomnia 06/11/2021     Priority: Medium     Formatting of this note might be different from the original. 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 Quit Sept 2022 Last Assessment & Plan: Formatting of this note might be different from the original. Recommend complete cessation      Essential hypertension 09/29/2020     Priority: Medium     Last Assessment & Plan: Formatting of this note might be different from the original. Well controlled Annual labs today with hand swelling work up        Ht 1.702 m (5' 7\")   Wt 76.2 kg (168 lb)   BMI 26.31 kg/m      Azam Varghese, NIRANJAN CNP  "

## (undated) DEVICE — SOL NACL 0.9% IRRIG 1000ML BOTTLE 2F7124

## (undated) DEVICE — LINEN TOWEL PACK X30 5481

## (undated) DEVICE — SOL WATER IRRIG 500ML BOTTLE 2F7113

## (undated) DEVICE — GLOVE BIOGEL PI MICRO SZ 7.5 48575

## (undated) DEVICE — PACK HAND CUSTOM ASC

## (undated) DEVICE — PREP CHLORAPREP 26ML TINTED ORANGE  260815

## (undated) DEVICE — PREP POVIDONE-IODINE 10% SOLUTION 4OZ BOTTLE MDS093944

## (undated) DEVICE — PACK NEURO MINOR UMMC SNE32MNMU4

## (undated) DEVICE — SU VICRYL 3-0 SH 27" UND J416H

## (undated) DEVICE — SU SILK 4-0 TIE 12X30" A303H

## (undated) DEVICE — GLOVE BIOGEL PI MICRO SZ 7.0 48570

## (undated) DEVICE — SU MONOCRYL 4-0 PS-2 27" UND Y426H

## (undated) DEVICE — TUBE ENDOTRACHEAL NIM TRIVANTAGE 7.0MM 8229707

## (undated) DEVICE — SUCTION MANIFOLD NEPTUNE 2 SYS 4 PORT 0702-020-000

## (undated) DEVICE — DRSG STERI STRIP 1X5" R1548

## (undated) DEVICE — SPONGE KITTNER 30-101

## (undated) DEVICE — DRSG STERI STRIP 1/2X4" R1547

## (undated) DEVICE — NIM ELEC SUBDERMAL NDL PAIRED 2 CHANNEL 8227410

## (undated) DEVICE — Device

## (undated) DEVICE — BNDG KLING 2" 2231

## (undated) DEVICE — LINEN ORTHO PACK 5446

## (undated) DEVICE — LABEL MEDICATION SYSTEM 3303-P

## (undated) DEVICE — SU SILK 3-0 TIE 12X30" A304H

## (undated) DEVICE — SU SILK 2-0 SH CR 8X18" C012D

## (undated) DEVICE — CLIP HORIZON SM RED WIDE SLOT 001201

## (undated) DEVICE — DRAPE POUCH INSTRUMENT 1018

## (undated) DEVICE — SOL NACL 0.9% IRRIG 500ML BOTTLE 2F7123

## (undated) DEVICE — STRAP UNIVERSAL POSITIONING 2-PIECE 4X47X76" 91-287

## (undated) DEVICE — DRSG GAUZE 4X4" 3033

## (undated) DEVICE — SU SILK 2-0 TIE 12X30" A305H

## (undated) DEVICE — COVER CAMERA IN-LIGHT DISP LT-C02

## (undated) DEVICE — DECANTER VIAL 2006S

## (undated) DEVICE — PREP SKIN SCRUB TRAY 4461A

## (undated) DEVICE — DRSG TELFA 3X8" 1238

## (undated) DEVICE — ESU GROUND PAD ADULT W/CORD E7507

## (undated) DEVICE — SOL WATER IRRIG 1000ML BOTTLE 2F7114

## (undated) DEVICE — NIM PROBE NS STD INCR PRASS TIP STRL LF DISP 8225825X

## (undated) DEVICE — RETR ELASTIC STAYS LONE STAR BLUNT DUAL LEAD 3550-1G

## (undated) DEVICE — DRSG TEGADERM 4X4 3/4" 1626W

## (undated) DEVICE — CATH TRAY FOLEY SURESTEP 16FR W/TMP PRB STLK LATEX A319416AM

## (undated) DEVICE — ESU FCP BIPOLAR NONSTICK STR 4"X0.4MM W/CORD 19-3002AU

## (undated) DEVICE — SU VICRYL 3-0 SH 8X18" UND J864D

## (undated) DEVICE — SPONGE LAP 18X18" X8435

## (undated) DEVICE — DRSG GAUZE 4X4" TRAY 6939

## (undated) DEVICE — CLIP HORIZON MED BLUE 002200

## (undated) RX ORDER — HYDROMORPHONE HYDROCHLORIDE 1 MG/ML
INJECTION, SOLUTION INTRAMUSCULAR; INTRAVENOUS; SUBCUTANEOUS
Status: DISPENSED
Start: 2024-01-09

## (undated) RX ORDER — ACETAMINOPHEN 325 MG/1
TABLET ORAL
Status: DISPENSED
Start: 2023-12-15

## (undated) RX ORDER — HYDROMORPHONE HCL IN WATER/PF 6 MG/30 ML
PATIENT CONTROLLED ANALGESIA SYRINGE INTRAVENOUS
Status: DISPENSED
Start: 2024-01-09

## (undated) RX ORDER — ACETAMINOPHEN 325 MG/1
TABLET ORAL
Status: DISPENSED
Start: 2024-01-09

## (undated) RX ORDER — OXYCODONE HYDROCHLORIDE 5 MG/1
TABLET ORAL
Status: DISPENSED
Start: 2024-01-09

## (undated) RX ORDER — FENTANYL CITRATE 50 UG/ML
INJECTION, SOLUTION INTRAMUSCULAR; INTRAVENOUS
Status: DISPENSED
Start: 2024-01-09

## (undated) RX ORDER — LIDOCAINE HYDROCHLORIDE AND EPINEPHRINE 10; 10 MG/ML; UG/ML
INJECTION, SOLUTION INFILTRATION; PERINEURAL
Status: DISPENSED
Start: 2024-01-09

## (undated) RX ORDER — LIDOCAINE HYDROCHLORIDE AND EPINEPHRINE 10; 10 MG/ML; UG/ML
INJECTION, SOLUTION INFILTRATION; PERINEURAL
Status: DISPENSED
Start: 2023-12-15

## (undated) RX ORDER — ONDANSETRON 2 MG/ML
INJECTION INTRAMUSCULAR; INTRAVENOUS
Status: DISPENSED
Start: 2024-01-09

## (undated) RX ORDER — HYDRALAZINE HYDROCHLORIDE 20 MG/ML
INJECTION INTRAMUSCULAR; INTRAVENOUS
Status: DISPENSED
Start: 2024-01-09

## (undated) RX ORDER — LABETALOL HYDROCHLORIDE 5 MG/ML
INJECTION, SOLUTION INTRAVENOUS
Status: DISPENSED
Start: 2024-01-09

## (undated) RX ORDER — PROPOFOL 10 MG/ML
INJECTION, EMULSION INTRAVENOUS
Status: DISPENSED
Start: 2024-01-09

## (undated) RX ORDER — FENTANYL CITRATE-0.9 % NACL/PF 10 MCG/ML
PLASTIC BAG, INJECTION (ML) INTRAVENOUS
Status: DISPENSED
Start: 2024-01-09